# Patient Record
Sex: FEMALE | Race: WHITE | Employment: FULL TIME | URBAN - METROPOLITAN AREA
[De-identification: names, ages, dates, MRNs, and addresses within clinical notes are randomized per-mention and may not be internally consistent; named-entity substitution may affect disease eponyms.]

---

## 2017-01-12 ENCOUNTER — OFFICE VISIT (OUTPATIENT)
Dept: FAMILY MEDICINE CLINIC | Age: 37
End: 2017-01-12

## 2017-01-12 VITALS
BODY MASS INDEX: 23.81 KG/M2 | DIASTOLIC BLOOD PRESSURE: 80 MMHG | RESPIRATION RATE: 16 BRPM | HEIGHT: 62 IN | TEMPERATURE: 99.2 F | HEART RATE: 110 BPM | WEIGHT: 129.4 LBS | SYSTOLIC BLOOD PRESSURE: 124 MMHG | OXYGEN SATURATION: 97 %

## 2017-01-12 DIAGNOSIS — R68.89 FLU-LIKE SYMPTOMS: ICD-10-CM

## 2017-01-12 DIAGNOSIS — R51.9 ACUTE NONINTRACTABLE HEADACHE, UNSPECIFIED HEADACHE TYPE: Primary | ICD-10-CM

## 2017-01-12 DIAGNOSIS — J06.9 UPPER RESPIRATORY TRACT INFECTION, UNSPECIFIED TYPE: ICD-10-CM

## 2017-01-12 DIAGNOSIS — R11.2 NAUSEA AND VOMITING, INTRACTABILITY OF VOMITING NOT SPECIFIED, UNSPECIFIED VOMITING TYPE: ICD-10-CM

## 2017-01-12 DIAGNOSIS — R19.7 DIARRHEA, UNSPECIFIED TYPE: ICD-10-CM

## 2017-01-12 LAB
INFLUENZA A ANTIBODY: NEGATIVE
INFLUENZA B ANTIBODY: NEGATIVE

## 2017-01-12 PROCEDURE — 87804 INFLUENZA ASSAY W/OPTIC: CPT | Performed by: NURSE PRACTITIONER

## 2017-01-12 PROCEDURE — 99213 OFFICE O/P EST LOW 20 MIN: CPT | Performed by: NURSE PRACTITIONER

## 2017-01-12 PROCEDURE — 96372 THER/PROPH/DIAG INJ SC/IM: CPT | Performed by: NURSE PRACTITIONER

## 2017-01-12 RX ORDER — ONDANSETRON 4 MG/1
4 TABLET, FILM COATED ORAL EVERY 8 HOURS PRN
Qty: 30 TABLET | Refills: 0 | Status: SHIPPED | OUTPATIENT
Start: 2017-01-12 | End: 2017-06-12 | Stop reason: ALTCHOICE

## 2017-01-12 RX ORDER — KETOROLAC TROMETHAMINE 30 MG/ML
60 INJECTION, SOLUTION INTRAMUSCULAR; INTRAVENOUS ONCE
Status: COMPLETED | OUTPATIENT
Start: 2017-01-12 | End: 2017-01-12

## 2017-01-12 RX ORDER — PROMETHAZINE HYDROCHLORIDE 25 MG/ML
25 INJECTION, SOLUTION INTRAMUSCULAR; INTRAVENOUS ONCE
Status: COMPLETED | OUTPATIENT
Start: 2017-01-12 | End: 2017-01-12

## 2017-01-12 RX ADMIN — KETOROLAC TROMETHAMINE 60 MG: 30 INJECTION, SOLUTION INTRAMUSCULAR; INTRAVENOUS at 17:20

## 2017-01-12 RX ADMIN — PROMETHAZINE HYDROCHLORIDE 25 MG: 25 INJECTION, SOLUTION INTRAMUSCULAR; INTRAVENOUS at 17:20

## 2017-01-12 ASSESSMENT — ENCOUNTER SYMPTOMS
SORE THROAT: 1
COUGH: 0
DIARRHEA: 1
SHORTNESS OF BREATH: 0
PHOTOPHOBIA: 1
VOMITING: 1
SINUS PRESSURE: 1
EYE PAIN: 1
SINUS PAIN: 1
WHEEZING: 0
ABDOMINAL PAIN: 0
NAUSEA: 1
RHINORRHEA: 1

## 2017-01-24 ENCOUNTER — OFFICE VISIT (OUTPATIENT)
Dept: FAMILY MEDICINE CLINIC | Age: 37
End: 2017-01-24

## 2017-01-24 VITALS
DIASTOLIC BLOOD PRESSURE: 74 MMHG | HEART RATE: 83 BPM | TEMPERATURE: 97.8 F | HEIGHT: 62 IN | SYSTOLIC BLOOD PRESSURE: 110 MMHG | OXYGEN SATURATION: 98 % | BODY MASS INDEX: 23.81 KG/M2 | RESPIRATION RATE: 20 BRPM | WEIGHT: 129.4 LBS

## 2017-01-24 DIAGNOSIS — J02.9 PHARYNGITIS, UNSPECIFIED ETIOLOGY: Primary | ICD-10-CM

## 2017-01-24 PROCEDURE — 99213 OFFICE O/P EST LOW 20 MIN: CPT | Performed by: NURSE PRACTITIONER

## 2017-01-24 RX ORDER — CLINDAMYCIN HYDROCHLORIDE 300 MG/1
300 CAPSULE ORAL 3 TIMES DAILY
Qty: 30 CAPSULE | Refills: 0 | Status: SHIPPED | OUTPATIENT
Start: 2017-01-24 | End: 2017-02-03

## 2017-01-24 ASSESSMENT — ENCOUNTER SYMPTOMS
SORE THROAT: 1
WHEEZING: 0
ABDOMINAL PAIN: 0
COUGH: 0
SINUS PRESSURE: 0
NAUSEA: 0
VOMITING: 0
SWOLLEN GLANDS: 1

## 2017-02-07 ENCOUNTER — OFFICE VISIT (OUTPATIENT)
Dept: FAMILY MEDICINE CLINIC | Age: 37
End: 2017-02-07

## 2017-02-07 VITALS
SYSTOLIC BLOOD PRESSURE: 108 MMHG | TEMPERATURE: 97.7 F | HEIGHT: 62 IN | WEIGHT: 129 LBS | RESPIRATION RATE: 18 BRPM | DIASTOLIC BLOOD PRESSURE: 62 MMHG | HEART RATE: 84 BPM | BODY MASS INDEX: 23.74 KG/M2

## 2017-02-07 DIAGNOSIS — H81.23 VESTIBULAR NEURONITIS, BILATERAL: Primary | ICD-10-CM

## 2017-02-07 DIAGNOSIS — R11.14 BILIOUS VOMITING WITH NAUSEA: ICD-10-CM

## 2017-02-07 DIAGNOSIS — J02.9 SORE THROAT: ICD-10-CM

## 2017-02-07 LAB
INFLUENZA A ANTIBODY: NORMAL
INFLUENZA B ANTIBODY: NORMAL
S PYO AG THROAT QL: NORMAL

## 2017-02-07 PROCEDURE — 87804 INFLUENZA ASSAY W/OPTIC: CPT | Performed by: FAMILY MEDICINE

## 2017-02-07 PROCEDURE — 99213 OFFICE O/P EST LOW 20 MIN: CPT | Performed by: FAMILY MEDICINE

## 2017-02-07 PROCEDURE — 87880 STREP A ASSAY W/OPTIC: CPT | Performed by: FAMILY MEDICINE

## 2017-02-07 RX ORDER — MECLIZINE HYDROCHLORIDE 25 MG/1
25 TABLET ORAL 3 TIMES DAILY PRN
Qty: 30 TABLET | Refills: 0 | Status: SHIPPED | OUTPATIENT
Start: 2017-02-07 | End: 2017-06-12 | Stop reason: ALTCHOICE

## 2017-02-14 ENCOUNTER — OFFICE VISIT (OUTPATIENT)
Dept: FAMILY MEDICINE CLINIC | Age: 37
End: 2017-02-14

## 2017-02-14 VITALS
BODY MASS INDEX: 23.77 KG/M2 | HEART RATE: 86 BPM | TEMPERATURE: 98 F | WEIGHT: 129.2 LBS | DIASTOLIC BLOOD PRESSURE: 64 MMHG | RESPIRATION RATE: 14 BRPM | SYSTOLIC BLOOD PRESSURE: 102 MMHG | HEIGHT: 62 IN

## 2017-02-14 DIAGNOSIS — F33.41 RECURRENT MAJOR DEPRESSIVE DISORDER, IN PARTIAL REMISSION (HCC): ICD-10-CM

## 2017-02-14 DIAGNOSIS — H81.23 VESTIBULAR NEURONITIS, BILATERAL: Primary | ICD-10-CM

## 2017-02-14 PROCEDURE — 99213 OFFICE O/P EST LOW 20 MIN: CPT | Performed by: FAMILY MEDICINE

## 2017-02-14 RX ORDER — FLUOXETINE HYDROCHLORIDE 40 MG/1
40 CAPSULE ORAL DAILY
Qty: 30 CAPSULE | Refills: 4 | Status: SHIPPED | OUTPATIENT
Start: 2017-02-14 | End: 2017-06-12 | Stop reason: SDUPTHER

## 2017-06-12 ENCOUNTER — OFFICE VISIT (OUTPATIENT)
Dept: FAMILY MEDICINE CLINIC | Age: 37
End: 2017-06-12

## 2017-06-12 VITALS
RESPIRATION RATE: 16 BRPM | HEART RATE: 78 BPM | BODY MASS INDEX: 23.96 KG/M2 | TEMPERATURE: 98 F | HEIGHT: 62 IN | DIASTOLIC BLOOD PRESSURE: 62 MMHG | WEIGHT: 130.2 LBS | SYSTOLIC BLOOD PRESSURE: 106 MMHG

## 2017-06-12 DIAGNOSIS — F33.41 RECURRENT MAJOR DEPRESSIVE DISORDER, IN PARTIAL REMISSION (HCC): Primary | ICD-10-CM

## 2017-06-12 DIAGNOSIS — K58.0 IRRITABLE BOWEL SYNDROME WITH DIARRHEA: ICD-10-CM

## 2017-06-12 PROCEDURE — 99213 OFFICE O/P EST LOW 20 MIN: CPT | Performed by: FAMILY MEDICINE

## 2017-06-12 RX ORDER — FLUOXETINE HYDROCHLORIDE 40 MG/1
40 CAPSULE ORAL DAILY
Qty: 90 CAPSULE | Refills: 1 | Status: SHIPPED | OUTPATIENT
Start: 2017-06-12 | End: 2017-12-12 | Stop reason: SDUPTHER

## 2017-12-12 ENCOUNTER — OFFICE VISIT (OUTPATIENT)
Dept: FAMILY MEDICINE CLINIC | Age: 37
End: 2017-12-12

## 2017-12-12 VITALS
RESPIRATION RATE: 12 BRPM | SYSTOLIC BLOOD PRESSURE: 112 MMHG | WEIGHT: 130.2 LBS | BODY MASS INDEX: 23.96 KG/M2 | TEMPERATURE: 97.2 F | DIASTOLIC BLOOD PRESSURE: 64 MMHG | HEIGHT: 62 IN | HEART RATE: 68 BPM

## 2017-12-12 DIAGNOSIS — F33.41 RECURRENT MAJOR DEPRESSIVE DISORDER, IN PARTIAL REMISSION (HCC): ICD-10-CM

## 2017-12-12 DIAGNOSIS — H81.11 BENIGN PAROXYSMAL POSITIONAL VERTIGO OF RIGHT EAR: Primary | ICD-10-CM

## 2017-12-12 PROCEDURE — 99212 OFFICE O/P EST SF 10 MIN: CPT | Performed by: FAMILY MEDICINE

## 2017-12-12 RX ORDER — FLUOXETINE HYDROCHLORIDE 40 MG/1
40 CAPSULE ORAL DAILY
Qty: 90 CAPSULE | Refills: 1 | Status: SHIPPED | OUTPATIENT
Start: 2017-12-12 | End: 2018-06-14 | Stop reason: SDUPTHER

## 2017-12-12 RX ORDER — MECLIZINE HYDROCHLORIDE 25 MG/1
25 TABLET ORAL 3 TIMES DAILY PRN
Qty: 40 TABLET | Refills: 0 | Status: SHIPPED | OUTPATIENT
Start: 2017-12-12 | End: 2020-05-12 | Stop reason: CLARIF

## 2017-12-12 ASSESSMENT — PATIENT HEALTH QUESTIONNAIRE - PHQ9
1. LITTLE INTEREST OR PLEASURE IN DOING THINGS: 0
SUM OF ALL RESPONSES TO PHQ QUESTIONS 1-9: 0
2. FEELING DOWN, DEPRESSED OR HOPELESS: 0
SUM OF ALL RESPONSES TO PHQ9 QUESTIONS 1 & 2: 0

## 2017-12-12 NOTE — PROGRESS NOTES
Chief Complaint   Patient presents with    6 Month Follow-Up     depression     Dizziness      Daina Layne 44 y.o. female  who presents for follow up of depression. Current symptoms include fatigue, impaired memory and insomnia. Symptoms have been gradually improving since that time. Patient denies anhedonia, depressed mood, difficulty concentrating, feelings of worthlessness/guilt, hopelessness, hypersomnia, psychomotor agitation, psychomotor retardation, recurrent thoughts of death, suicidal attempt, suicidal thoughts with specific plan, suicidal thoughts without plan, weight gain and weight loss. Previous treatment includes: medication. She complains of the following side effects from the treatment: none. Vertigo  Patient complains of rotary vertigo, unsteadiness, staggering, falling, nausea/vomiting. The symptoms started 3 weeks ago and are unchanged. The attacks occur every  few hours and last a fewminutes. Positions that worsen symptoms: any motion, bending over, rolling in bed to the left, rolling in bed to the right and turning head. She has associated nausea but no vomiting. Previous workup/treatments: none. Associated ear symptoms: none. Associated CNS symptoms: none. Recent infections: none. Head trauma: denied. Drug ingestion: none ise exposure: no occupational exposure. Family history: non-contributory  Patent denies dimming vision, visual floaters, speech change, confusion, unconsciousness, drowsiness, personality change, headaches and paresthesias. No recent URI, flu, ear infection, sinus infection, throat infection. Patient's medications, allergies, past medical, surgical, social and family histories were reviewed and updated as appropriate.     Review of Systems  Review of Systems - General ROS: negative for - fatigue, fever, malaise, night sweats,  or weight loss  Respiratory ROS: negative for - cough, hemoptysis, pleuritic pain, shortness of breath or wheezing  Cardiovascular ROS: no chest pain or dyspnea on exertion  Gastrointestinal ROS: no abdominal pain, change in bowel habits, or black or bloody stools  Genito-Urinary ROS: no dysuria, trouble voiding, or hematuria  Musculoskeletal ROS: negative for - joint pain, joint stiffness, joint swelling, muscle pain or muscular weakness  Neurological ROS: negative for - dizziness, gait disturbance, headaches, impaired coordination/balance, numbness/tingling, tremors or visual changes           Objective:   /64   Pulse 68   Temp 97.2 °F (36.2 °C) (Temporal)   Resp 12   Ht 5' 2\" (1.575 m)   Wt 130 lb 3.2 oz (59.1 kg)   LMP 12/11/2017   BMI 23.81 kg/m²   She appears well, in no apparent distress. Alert and oriented times three, pleasant and cooperative. Vital signs are as documented in vital signs section. .The neck is supple and free of adenopathy or masses, the thyroid is normal without enlargement or nodules. Chest is clear, no wheezing or rales. Normal symmetric air entry throughout both lung fields. No chest wall deformities or tenderness. S1 and S2 normal, no murmurs, clicks, gallops or rubs. Regular rate and rhythm. The abdomen is soft without tenderness, guarding, mass, rebound or organomegaly. Bowel sounds are normal. No CVA tenderness or inguinal adenopathy noted. Extremities: peripheral pulses normal, no pedal edema, no clubbing or cyanosis. Assessment:     Encounter Diagnoses   Name Primary?     Recurrent major depressive disorder, in partial remission (HCC)     Benign paroxysmal positional vertigo of right ear Yes        Plan:     Outpatient Encounter Prescriptions as of 12/12/2017   Medication Sig Dispense Refill    FLUoxetine (PROZAC) 40 MG capsule Take 1 capsule by mouth daily 90 capsule 1    meclizine (ANTIVERT) 25 MG tablet Take 1 tablet by mouth 3 times daily as needed for Dizziness or Nausea 40 tablet 0    Multiple Vitamins-Minerals (RA ONE DAILY GUMMY VITES PO) Take 2 tablets by mouth daily     

## 2017-12-12 NOTE — PATIENT INSTRUCTIONS
Patient Education        Depression Treatment: Care Instructions  Your Care Instructions    Depression is a condition that affects the way you feel, think, and act. It causes symptoms such as low energy, loss of interest in daily activities, and sadness or grouchiness that goes on for a long time. Depression is very common and affects men and women of all ages. Depression is a medical illness caused by changes in the natural chemicals in your brain. It is not a character flaw, and it does not mean that you are a bad or weak person. It does not mean that you are going crazy. It is important to know that depression can be treated. Medicines, counseling, and self-care can all help. Many people do not get help because they are embarrassed or think that they will get over the depression on their own. But some people do not get better without treatment. Follow-up care is a key part of your treatment and safety. Be sure to make and go to all appointments, and call your doctor if you are having problems. It's also a good idea to know your test results and keep a list of the medicines you take. How can you care for yourself at home? Learn about antidepressant medicines  Antidepressant medicines can improve or end the symptoms of depression. You may need to take the medicine for at least 6 months, and often longer. Keep taking your medicine even if you feel better. If you stop taking it too soon, your symptoms may come back or get worse. You may start to feel better within 1 to 3 weeks of taking antidepressant medicine. But it can take as many as 6 to 8 weeks to see more improvement. Talk to your doctor if you have problems with your medicine or if you do not notice any improvement after 3 weeks. Antidepressants can make you feel tired, dizzy, or nervous. Some people have dry mouth, constipation, headaches, sexual problems, an upset stomach, or diarrhea.  Many of these side effects are mild and go away on their own after you take the medicine for a few weeks. Some may last longer. Talk to your doctor if side effects bother you too much. You might be able to try a different medicine. If you are pregnant or breastfeeding, talk to your doctor about what medicines you can take. Learn about counseling  In many cases, counseling can work as well as medicines to treat mild to moderate depression. Counseling is done by licensed mental health providers, such as psychologists, social workers, and some types of nurses. It can be done in one-on-one sessions or in a group setting. Many people find group sessions helpful. Cognitive-behavioral therapy is a type of counseling. In this treatment therapy, you learn how to see and change unhelpful thinking styles that may be adding to your depression. Counseling and medicines often work well when used together. To manage depression  · Be physically active. Getting 30 minutes of exercise each day is good for your body and your mind. Begin slowly if it is hard for you to get started. If you already exercise, keep it up. · Plan something pleasant for yourself every day. Include activities that you have enjoyed in the past.  · Get enough sleep. Talk to your doctor if you have problems sleeping. · Eat a balanced diet. If you do not feel hungry, eat small snacks rather than large meals. · Do not drink alcohol, use illegal drugs, or take medicines that your doctor has not prescribed for you. They may interfere with your treatment. · Spend time with family and friends. It may help to speak openly about your depression with people you trust.  · Take your medicines exactly as prescribed. Call your doctor if you think you are having a problem with your medicine. · Do not make major life decisions while you are depressed. Depression may change the way you think. You will be able to make better decisions after you feel better. · Think positively.  Challenge negative thoughts with statements such as \"I am hopeful\"; \"Things will get better\"; and \"I can ask for the help I need. \" Write down these statements and read them often, even if you don't believe them yet. · Be patient with yourself. It took time for your depression to develop, and it will take time for your symptoms to improve. Do not take on too much or be too hard on yourself. · Learn all you can about depression from written and online materials. · Check out behavioral health classes to learn more about dealing with depression. · Keep the numbers for these national suicide hotlines: 1-854-785-TALK (2-102.316.4713) and 3-943-SHXXFFM (3-999.725.2245). If you or someone you know talks about suicide or feeling hopeless, get help right away. When should you call for help? Call 911 anytime you think you may need emergency care. For example, call if:  ? · You feel you cannot stop from hurting yourself or someone else. ?Call your doctor now or seek immediate medical care if:  ? · You hear voices. ? · You feel much more depressed. ? Watch closely for changes in your health, and be sure to contact your doctor if:  ? · You are having problems with your depression medicine. ? · You are not getting better as expected. Where can you learn more? Go to [email or web address] and sign in to your Payment plugin account. Enter X631 in the [facility] box to learn more about \"Depression Treatment: Care Instructions. \"     If you do not have an account, please click on the \"Sign Up Now\" link. Current as of: May 12, 2017  Content Version: 11.4  © 8819-8455 [employer]. Care instructions adapted under license by [facility] [employer] [facility] ([facility]). If you have questions about a medical condition or this instruction, always ask your healthcare professional. [address] any warranty or liability for your use of this information.        Patient Education        Depression Treatment: Care Instructions  Your Care doctor if side effects bother you too much. You might be able to try a different medicine. If you are pregnant or breastfeeding, talk to your doctor about what medicines you can take. Learn about counseling  In many cases, counseling can work as well as medicines to treat mild to moderate depression. Counseling is done by licensed mental health providers, such as psychologists, social workers, and some types of nurses. It can be done in one-on-one sessions or in a group setting. Many people find group sessions helpful. Cognitive-behavioral therapy is a type of counseling. In this treatment therapy, you learn how to see and change unhelpful thinking styles that may be adding to your depression. Counseling and medicines often work well when used together. To manage depression  · Be physically active. Getting 30 minutes of exercise each day is good for your body and your mind. Begin slowly if it is hard for you to get started. If you already exercise, keep it up. · Plan something pleasant for yourself every day. Include activities that you have enjoyed in the past.  · Get enough sleep. Talk to your doctor if you have problems sleeping. · Eat a balanced diet. If you do not feel hungry, eat small snacks rather than large meals. · Do not drink alcohol, use illegal drugs, or take medicines that your doctor has not prescribed for you. They may interfere with your treatment. · Spend time with family and friends. It may help to speak openly about your depression with people you trust.  · Take your medicines exactly as prescribed. Call your doctor if you think you are having a problem with your medicine. · Do not make major life decisions while you are depressed. Depression may change the way you think. You will be able to make better decisions after you feel better. · Think positively. Challenge negative thoughts with statements such as \"I am hopeful\"; \"Things will get better\"; and \"I can ask for the help I need. \"

## 2018-06-14 ENCOUNTER — OFFICE VISIT (OUTPATIENT)
Dept: FAMILY MEDICINE CLINIC | Age: 38
End: 2018-06-14

## 2018-06-14 VITALS
BODY MASS INDEX: 23.26 KG/M2 | WEIGHT: 126.4 LBS | SYSTOLIC BLOOD PRESSURE: 116 MMHG | RESPIRATION RATE: 18 BRPM | HEIGHT: 62 IN | HEART RATE: 84 BPM | TEMPERATURE: 97 F | DIASTOLIC BLOOD PRESSURE: 60 MMHG

## 2018-06-14 DIAGNOSIS — F33.41 RECURRENT MAJOR DEPRESSIVE DISORDER, IN PARTIAL REMISSION (HCC): Primary | ICD-10-CM

## 2018-06-14 PROCEDURE — 99212 OFFICE O/P EST SF 10 MIN: CPT | Performed by: FAMILY MEDICINE

## 2018-06-14 RX ORDER — FLUOXETINE HYDROCHLORIDE 40 MG/1
40 CAPSULE ORAL DAILY
Qty: 90 CAPSULE | Refills: 1 | Status: SHIPPED | OUTPATIENT
Start: 2018-06-14 | End: 2019-02-04 | Stop reason: SDUPTHER

## 2018-06-14 ASSESSMENT — PATIENT HEALTH QUESTIONNAIRE - PHQ9
SUM OF ALL RESPONSES TO PHQ QUESTIONS 1-9: 0
SUM OF ALL RESPONSES TO PHQ9 QUESTIONS 1 & 2: 0
2. FEELING DOWN, DEPRESSED OR HOPELESS: 0
1. LITTLE INTEREST OR PLEASURE IN DOING THINGS: 0

## 2019-02-04 ENCOUNTER — OFFICE VISIT (OUTPATIENT)
Dept: FAMILY MEDICINE CLINIC | Age: 39
End: 2019-02-04
Payer: COMMERCIAL

## 2019-02-04 VITALS
HEIGHT: 62 IN | WEIGHT: 130 LBS | SYSTOLIC BLOOD PRESSURE: 96 MMHG | BODY MASS INDEX: 23.92 KG/M2 | TEMPERATURE: 97 F | RESPIRATION RATE: 12 BRPM | DIASTOLIC BLOOD PRESSURE: 62 MMHG | HEART RATE: 72 BPM

## 2019-02-04 DIAGNOSIS — K58.0 IRRITABLE BOWEL SYNDROME WITH DIARRHEA: ICD-10-CM

## 2019-02-04 DIAGNOSIS — N94.6 DYSMENORRHEA: ICD-10-CM

## 2019-02-04 DIAGNOSIS — N80.9 ENDOMETRIOSIS: Primary | ICD-10-CM

## 2019-02-04 DIAGNOSIS — F33.41 RECURRENT MAJOR DEPRESSIVE DISORDER, IN PARTIAL REMISSION (HCC): ICD-10-CM

## 2019-02-04 PROCEDURE — 99213 OFFICE O/P EST LOW 20 MIN: CPT | Performed by: FAMILY MEDICINE

## 2019-02-04 RX ORDER — FLUOXETINE HYDROCHLORIDE 40 MG/1
40 CAPSULE ORAL DAILY
Qty: 90 CAPSULE | Refills: 1 | Status: SHIPPED | OUTPATIENT
Start: 2019-02-04 | End: 2019-11-12

## 2019-02-04 RX ORDER — IBUPROFEN 600 MG/1
600 TABLET ORAL EVERY 6 HOURS PRN
Qty: 90 TABLET | Refills: 0 | Status: SHIPPED | OUTPATIENT
Start: 2019-02-04 | End: 2020-05-12 | Stop reason: SDUPTHER

## 2019-02-04 ASSESSMENT — PATIENT HEALTH QUESTIONNAIRE - PHQ9
2. FEELING DOWN, DEPRESSED OR HOPELESS: 0
1. LITTLE INTEREST OR PLEASURE IN DOING THINGS: 0
1. LITTLE INTEREST OR PLEASURE IN DOING THINGS: 1
SUM OF ALL RESPONSES TO PHQ QUESTIONS 1-9: 0
SUM OF ALL RESPONSES TO PHQ QUESTIONS 1-9: 0
SUM OF ALL RESPONSES TO PHQ9 QUESTIONS 1 & 2: 0
2. FEELING DOWN, DEPRESSED OR HOPELESS: 0
SUM OF ALL RESPONSES TO PHQ QUESTIONS 1-9: 1
SUM OF ALL RESPONSES TO PHQ9 QUESTIONS 1 & 2: 1
SUM OF ALL RESPONSES TO PHQ QUESTIONS 1-9: 1

## 2019-03-12 ENCOUNTER — TELEPHONE (OUTPATIENT)
Dept: FAMILY MEDICINE CLINIC | Age: 39
End: 2019-03-12

## 2019-03-18 ENCOUNTER — TELEPHONE (OUTPATIENT)
Dept: FAMILY MEDICINE CLINIC | Age: 39
End: 2019-03-18

## 2019-11-12 ENCOUNTER — OFFICE VISIT (OUTPATIENT)
Dept: FAMILY MEDICINE CLINIC | Age: 39
End: 2019-11-12

## 2019-11-12 VITALS
DIASTOLIC BLOOD PRESSURE: 74 MMHG | HEIGHT: 62 IN | RESPIRATION RATE: 15 BRPM | WEIGHT: 130 LBS | SYSTOLIC BLOOD PRESSURE: 104 MMHG | HEART RATE: 82 BPM | BODY MASS INDEX: 23.92 KG/M2 | TEMPERATURE: 98.2 F | OXYGEN SATURATION: 99 %

## 2019-11-12 DIAGNOSIS — F32.A DEPRESSION, UNSPECIFIED DEPRESSION TYPE: Primary | ICD-10-CM

## 2019-11-12 DIAGNOSIS — Z12.39 SCREENING FOR BREAST CANCER: ICD-10-CM

## 2019-11-12 PROCEDURE — 99213 OFFICE O/P EST LOW 20 MIN: CPT | Performed by: INTERNAL MEDICINE

## 2019-11-12 RX ORDER — FLUOXETINE HYDROCHLORIDE 40 MG/1
40 CAPSULE ORAL DAILY
Qty: 90 CAPSULE | Refills: 1 | Status: SHIPPED | OUTPATIENT
Start: 2019-11-12 | End: 2020-07-14

## 2019-11-12 ASSESSMENT — ENCOUNTER SYMPTOMS
SHORTNESS OF BREATH: 0
ABDOMINAL PAIN: 0
BACK PAIN: 0
EYE PAIN: 0

## 2020-05-12 ENCOUNTER — TELEPHONE (OUTPATIENT)
Dept: FAMILY MEDICINE CLINIC | Age: 40
End: 2020-05-12

## 2020-05-12 ENCOUNTER — VIRTUAL VISIT (OUTPATIENT)
Dept: FAMILY MEDICINE CLINIC | Age: 40
End: 2020-05-12

## 2020-05-12 PROCEDURE — 99214 OFFICE O/P EST MOD 30 MIN: CPT | Performed by: INTERNAL MEDICINE

## 2020-05-12 RX ORDER — BUSPIRONE HYDROCHLORIDE 5 MG/1
5 TABLET ORAL 3 TIMES DAILY PRN
Qty: 90 TABLET | Refills: 0 | Status: SHIPPED | OUTPATIENT
Start: 2020-05-12 | End: 2020-06-11

## 2020-05-12 RX ORDER — IBUPROFEN 600 MG/1
600 TABLET ORAL EVERY 8 HOURS PRN
Qty: 270 TABLET | Refills: 0 | Status: SHIPPED | OUTPATIENT
Start: 2020-05-12 | End: 2020-08-10

## 2020-05-12 ASSESSMENT — ENCOUNTER SYMPTOMS
BACK PAIN: 0
ABDOMINAL PAIN: 0
EYE PAIN: 0
SHORTNESS OF BREATH: 0

## 2020-05-12 NOTE — PATIENT INSTRUCTIONS
throat. Call your doctor at once if you have:  · chest pain;  · shortness of breath; or  · a light-headed feeling, like you might pass out. Common side effects may include:  · headache;  · dizziness, drowsiness;  · sleep problems (insomnia);  · nausea, upset stomach; or  · feeling nervous or excited. This is not a complete list of side effects and others may occur. Call your doctor for medical advice about side effects. You may report side effects to FDA at 1-337-FDA-4214. What other drugs will affect buspirone? Taking this medicine with other drugs that make you sleepy or slow your breathing can worsen these effects. Ask your doctor before taking buspirone with a sleeping pill, narcotic pain medicine, muscle relaxer, or medicine for anxiety, depression, or seizures. Other drugs may interact with buspirone, including prescription and over-the-counter medicines, vitamins, and herbal products. Tell each of your health care providers about all medicines you use now and any medicine you start or stop using. Where can I get more information? Your pharmacist can provide more information about buspirone. Remember, keep this and all other medicines out of the reach of children, never share your medicines with others, and use this medication only for the indication prescribed. Every effort has been made to ensure that the information provided by [address] is accurate, up-to-date, and complete, but no guarantee is made to that effect. Drug information contained herein may be time sensitive. [facility] information has been compiled for use by healthcare practitioners and consumers in the [place] and therefore [facility] does not warrant that uses outside of the [place] are appropriate, unless specifically indicated otherwise. [facility]'s drug information does not endorse drugs, diagnose patients or recommend therapy.  [employer]'s drug information is an informational resource designed to assist licensed healthcare practitioners in caring for their patients and/or to serve consumers viewing this service as a supplement to, and not a substitute for, the expertise, skill, knowledge and judgment of healthcare practitioners. The absence of a warning for a given drug or drug combination in no way should be construed to indicate that the drug or drug combination is safe, effective or appropriate for any given patient. [facility] does not assume any responsibility for any aspect of healthcare administered with the aid of information [facility] provides. The information contained herein is not intended to cover all possible uses, directions, precautions, warnings, drug interactions, allergic reactions, or adverse effects. If you have questions about the drugs you are taking, check with your doctor, nurse or pharmacist.  Copyright 6557-8308 [address]. Version: 5.01. Revision date: 12/14/2015. Care instructions adapted under license by [facility] ([facility]). If you have questions about a medical condition or this instruction, always ask your healthcare professional. [place] [address] any warranty or liability for your use of this information.

## 2020-07-14 RX ORDER — FLUOXETINE HYDROCHLORIDE 40 MG/1
40 CAPSULE ORAL DAILY
Qty: 90 CAPSULE | Refills: 0 | Status: SHIPPED | OUTPATIENT
Start: 2020-07-14 | End: 2020-12-11

## 2020-12-11 RX ORDER — FLUOXETINE HYDROCHLORIDE 40 MG/1
CAPSULE ORAL
Qty: 30 CAPSULE | Refills: 1 | Status: SHIPPED | OUTPATIENT
Start: 2020-12-11

## 2023-02-23 PROBLEM — K21.00 GASTROESOPHAGEAL REFLUX DISEASE WITH ESOPHAGITIS: Status: ACTIVE | Noted: 2023-02-23

## 2023-02-23 PROBLEM — K91.30 ANASTOMOTIC STRICTURE OF SMALL INTESTINE: Status: ACTIVE | Noted: 2023-02-23

## 2023-02-23 PROBLEM — R53.82 CHRONIC FATIGUE: Status: ACTIVE | Noted: 2023-02-23

## 2023-02-23 PROBLEM — K52.9 CHRONIC DIARRHEA: Status: ACTIVE | Noted: 2023-02-23

## 2023-02-23 PROBLEM — K50.00 CROHN'S DISEASE OF SMALL INTESTINE WITHOUT COMPLICATION (MULTI): Status: ACTIVE | Noted: 2023-02-23

## 2023-02-23 PROBLEM — F41.1 GENERALIZED ANXIETY DISORDER: Status: ACTIVE | Noted: 2023-02-23

## 2023-02-23 PROBLEM — D72.829 LEUKOCYTOSIS: Status: ACTIVE | Noted: 2023-02-23

## 2023-02-23 PROBLEM — R10.13 EPIGASTRIC PAIN: Status: ACTIVE | Noted: 2023-02-23

## 2023-02-23 PROBLEM — L20.9 ATOPIC DERMATITIS: Status: ACTIVE | Noted: 2023-02-23

## 2023-02-23 PROBLEM — M26.621 RIGHT-SIDED TEMPOROMANDIBULAR JOINT PAIN-DYSFUNCTION SYNDROME: Status: ACTIVE | Noted: 2023-02-23

## 2023-02-23 PROBLEM — N93.9 ABNORMAL UTERINE BLEEDING (AUB): Status: ACTIVE | Noted: 2023-02-23

## 2023-02-23 PROBLEM — H81.10 BENIGN PAROXYSMAL POSITIONAL VERTIGO: Status: ACTIVE | Noted: 2023-02-23

## 2023-02-23 PROBLEM — B35.1 ONYCHOMYCOSIS OF TOENAIL: Status: ACTIVE | Noted: 2023-02-23

## 2023-02-23 PROBLEM — K91.89 ANASTOMOTIC STRICTURE OF SMALL INTESTINE: Status: ACTIVE | Noted: 2023-02-23

## 2023-02-23 PROBLEM — R73.9 HYPERGLYCEMIA: Status: ACTIVE | Noted: 2023-02-23

## 2023-02-23 PROBLEM — F33.1 MODERATE EPISODE OF RECURRENT MAJOR DEPRESSIVE DISORDER (MULTI): Status: ACTIVE | Noted: 2023-02-23

## 2023-02-23 PROBLEM — R51.9 HEADACHE: Status: ACTIVE | Noted: 2023-02-23

## 2023-02-23 RX ORDER — PIMECROLIMUS 10 MG/G
1 CREAM TOPICAL 2 TIMES DAILY
COMMUNITY
Start: 2021-10-20

## 2023-02-23 RX ORDER — ONDANSETRON 4 MG/1
4 TABLET, FILM COATED ORAL EVERY 6 HOURS
COMMUNITY
Start: 2021-08-03

## 2023-02-23 RX ORDER — FLUOXETINE HYDROCHLORIDE 40 MG/1
1 CAPSULE ORAL DAILY
COMMUNITY
End: 2023-04-07 | Stop reason: SDUPTHER

## 2023-02-23 RX ORDER — OMEPRAZOLE 40 MG/1
40 CAPSULE, DELAYED RELEASE ORAL
COMMUNITY
Start: 2021-04-14 | End: 2023-04-07 | Stop reason: SDUPTHER

## 2023-02-23 RX ORDER — PHENYLPROPANOLAMINE/CLEMASTINE 75-1.34MG
200 TABLET, EXTENDED RELEASE ORAL 2 TIMES DAILY
COMMUNITY

## 2023-02-23 RX ORDER — MULTIVITAMIN
1 TABLET ORAL DAILY
COMMUNITY

## 2023-04-07 ENCOUNTER — LAB (OUTPATIENT)
Dept: LAB | Facility: LAB | Age: 43
End: 2023-04-07
Payer: COMMERCIAL

## 2023-04-07 ENCOUNTER — OFFICE VISIT (OUTPATIENT)
Dept: PRIMARY CARE | Facility: CLINIC | Age: 43
End: 2023-04-07
Payer: COMMERCIAL

## 2023-04-07 VITALS
OXYGEN SATURATION: 97 % | BODY MASS INDEX: 24.37 KG/M2 | HEART RATE: 89 BPM | DIASTOLIC BLOOD PRESSURE: 62 MMHG | WEIGHT: 132.4 LBS | HEIGHT: 62 IN | SYSTOLIC BLOOD PRESSURE: 116 MMHG

## 2023-04-07 DIAGNOSIS — K21.00 GASTROESOPHAGEAL REFLUX DISEASE WITH ESOPHAGITIS WITHOUT HEMORRHAGE: ICD-10-CM

## 2023-04-07 DIAGNOSIS — Z00.00 HEALTHCARE MAINTENANCE: ICD-10-CM

## 2023-04-07 DIAGNOSIS — F33.1 MODERATE EPISODE OF RECURRENT MAJOR DEPRESSIVE DISORDER (MULTI): ICD-10-CM

## 2023-04-07 DIAGNOSIS — F41.1 GENERALIZED ANXIETY DISORDER: ICD-10-CM

## 2023-04-07 DIAGNOSIS — K50.00 CROHN'S DISEASE OF SMALL INTESTINE WITHOUT COMPLICATION (MULTI): ICD-10-CM

## 2023-04-07 DIAGNOSIS — Z00.00 HEALTHCARE MAINTENANCE: Primary | ICD-10-CM

## 2023-04-07 LAB
ALANINE AMINOTRANSFERASE (SGPT) (U/L) IN SER/PLAS: 12 U/L (ref 7–45)
ALBUMIN (G/DL) IN SER/PLAS: 4.3 G/DL (ref 3.4–5)
ALKALINE PHOSPHATASE (U/L) IN SER/PLAS: 65 U/L (ref 33–110)
ANION GAP IN SER/PLAS: 14 MMOL/L (ref 10–20)
ASPARTATE AMINOTRANSFERASE (SGOT) (U/L) IN SER/PLAS: 12 U/L (ref 9–39)
BASOPHILS (10*3/UL) IN BLOOD BY AUTOMATED COUNT: 0.06 X10E9/L (ref 0–0.1)
BASOPHILS/100 LEUKOCYTES IN BLOOD BY AUTOMATED COUNT: 0.5 % (ref 0–2)
BILIRUBIN TOTAL (MG/DL) IN SER/PLAS: 1 MG/DL (ref 0–1.2)
CALCIUM (MG/DL) IN SER/PLAS: 9.3 MG/DL (ref 8.6–10.3)
CARBON DIOXIDE, TOTAL (MMOL/L) IN SER/PLAS: 25 MMOL/L (ref 21–32)
CHLORIDE (MMOL/L) IN SER/PLAS: 106 MMOL/L (ref 98–107)
CHOLESTEROL (MG/DL) IN SER/PLAS: 180 MG/DL (ref 0–199)
CHOLESTEROL IN HDL (MG/DL) IN SER/PLAS: 45.2 MG/DL
CHOLESTEROL/HDL RATIO: 4
CREATININE (MG/DL) IN SER/PLAS: 0.81 MG/DL (ref 0.5–1.05)
EOSINOPHILS (10*3/UL) IN BLOOD BY AUTOMATED COUNT: 0.23 X10E9/L (ref 0–0.7)
EOSINOPHILS/100 LEUKOCYTES IN BLOOD BY AUTOMATED COUNT: 1.7 % (ref 0–6)
ERYTHROCYTE DISTRIBUTION WIDTH (RATIO) BY AUTOMATED COUNT: 13 % (ref 11.5–14.5)
ERYTHROCYTE MEAN CORPUSCULAR HEMOGLOBIN CONCENTRATION (G/DL) BY AUTOMATED: 32.3 G/DL (ref 32–36)
ERYTHROCYTE MEAN CORPUSCULAR VOLUME (FL) BY AUTOMATED COUNT: 92 FL (ref 80–100)
ERYTHROCYTES (10*6/UL) IN BLOOD BY AUTOMATED COUNT: 4.72 X10E12/L (ref 4–5.2)
GFR FEMALE: >90 ML/MIN/1.73M2
GLUCOSE (MG/DL) IN SER/PLAS: 84 MG/DL (ref 74–99)
HEMATOCRIT (%) IN BLOOD BY AUTOMATED COUNT: 43.6 % (ref 36–46)
HEMOGLOBIN (G/DL) IN BLOOD: 14.1 G/DL (ref 12–16)
IMMATURE GRANULOCYTES/100 LEUKOCYTES IN BLOOD BY AUTOMATED COUNT: 0.5 % (ref 0–0.9)
LDL: 113 MG/DL (ref 0–99)
LEUKOCYTES (10*3/UL) IN BLOOD BY AUTOMATED COUNT: 13.3 X10E9/L (ref 4.4–11.3)
LYMPHOCYTES (10*3/UL) IN BLOOD BY AUTOMATED COUNT: 2.3 X10E9/L (ref 1.2–4.8)
LYMPHOCYTES/100 LEUKOCYTES IN BLOOD BY AUTOMATED COUNT: 17.4 % (ref 13–44)
MONOCYTES (10*3/UL) IN BLOOD BY AUTOMATED COUNT: 0.69 X10E9/L (ref 0.1–1)
MONOCYTES/100 LEUKOCYTES IN BLOOD BY AUTOMATED COUNT: 5.2 % (ref 2–10)
NEUTROPHILS (10*3/UL) IN BLOOD BY AUTOMATED COUNT: 9.91 X10E9/L (ref 1.2–7.7)
NEUTROPHILS/100 LEUKOCYTES IN BLOOD BY AUTOMATED COUNT: 74.7 % (ref 40–80)
PLATELETS (10*3/UL) IN BLOOD AUTOMATED COUNT: 363 X10E9/L (ref 150–450)
POTASSIUM (MMOL/L) IN SER/PLAS: 4.2 MMOL/L (ref 3.5–5.3)
PROTEIN TOTAL: 6.6 G/DL (ref 6.4–8.2)
SODIUM (MMOL/L) IN SER/PLAS: 141 MMOL/L (ref 136–145)
TRIGLYCERIDE (MG/DL) IN SER/PLAS: 107 MG/DL (ref 0–149)
UREA NITROGEN (MG/DL) IN SER/PLAS: 8 MG/DL (ref 6–23)
VLDL: 21 MG/DL (ref 0–40)

## 2023-04-07 PROCEDURE — 80053 COMPREHEN METABOLIC PANEL: CPT

## 2023-04-07 PROCEDURE — 99396 PREV VISIT EST AGE 40-64: CPT | Performed by: FAMILY MEDICINE

## 2023-04-07 PROCEDURE — 85025 COMPLETE CBC W/AUTO DIFF WBC: CPT

## 2023-04-07 PROCEDURE — 80061 LIPID PANEL: CPT

## 2023-04-07 PROCEDURE — 36415 COLL VENOUS BLD VENIPUNCTURE: CPT

## 2023-04-07 PROCEDURE — 1036F TOBACCO NON-USER: CPT | Performed by: FAMILY MEDICINE

## 2023-04-07 RX ORDER — FLUOXETINE HYDROCHLORIDE 40 MG/1
40 CAPSULE ORAL DAILY
Qty: 90 CAPSULE | Refills: 1 | Status: SHIPPED | OUTPATIENT
Start: 2023-04-07 | End: 2023-08-11 | Stop reason: SDUPTHER

## 2023-04-07 RX ORDER — OMEPRAZOLE 40 MG/1
40 CAPSULE, DELAYED RELEASE ORAL
Qty: 30 CAPSULE | Refills: 5 | Status: SHIPPED | OUTPATIENT
Start: 2023-04-07 | End: 2023-08-11

## 2023-04-07 RX ORDER — ARIPIPRAZOLE 2 MG/1
2 TABLET ORAL DAILY
Qty: 30 TABLET | Refills: 3 | Status: SHIPPED | OUTPATIENT
Start: 2023-04-07 | End: 2023-08-12

## 2023-04-07 ASSESSMENT — ANXIETY QUESTIONNAIRES
IF YOU CHECKED OFF ANY PROBLEMS ON THIS QUESTIONNAIRE, HOW DIFFICULT HAVE THESE PROBLEMS MADE IT FOR YOU TO DO YOUR WORK, TAKE CARE OF THINGS AT HOME, OR GET ALONG WITH OTHER PEOPLE: SOMEWHAT DIFFICULT
7. FEELING AFRAID AS IF SOMETHING AWFUL MIGHT HAPPEN: NOT AT ALL
2. NOT BEING ABLE TO STOP OR CONTROL WORRYING: NEARLY EVERY DAY
6. BECOMING EASILY ANNOYED OR IRRITABLE: NEARLY EVERY DAY
GAD7 TOTAL SCORE: 16
5. BEING SO RESTLESS THAT IT IS HARD TO SIT STILL: NEARLY EVERY DAY
4. TROUBLE RELAXING: NEARLY EVERY DAY
1. FEELING NERVOUS, ANXIOUS, OR ON EDGE: SEVERAL DAYS
3. WORRYING TOO MUCH ABOUT DIFFERENT THINGS: NEARLY EVERY DAY

## 2023-04-07 ASSESSMENT — PATIENT HEALTH QUESTIONNAIRE - PHQ9
1. LITTLE INTEREST OR PLEASURE IN DOING THINGS: NOT AT ALL
SUM OF ALL RESPONSES TO PHQ9 QUESTIONS 1 AND 2: 0
2. FEELING DOWN, DEPRESSED OR HOPELESS: NOT AT ALL

## 2023-04-07 NOTE — ASSESSMENT & PLAN NOTE
Handouts describing disease, natural history, and treatment were given to the patient.  Reviewed concept of anxiety as biochemical imbalance of neurotransmitters and rationale for treatment.  Instructed patient to contact office or on-call physician promptly should condition worsen or any new symptoms appear and provided on-call telephone numbers. IF THE PATIENT HAS ANY SUICIDAL OR HOMICIDAL IDEATIONS, CALL THE OFFICE, DISCUSS WITH A SUPPORT MEMBER, OR GO TO THE ER IMMEDIATELY. Patient was agreeable with this plan.

## 2023-04-07 NOTE — ASSESSMENT & PLAN NOTE
We will start her on Abilify 2 mg daily.  The risks and benefits of my recommendations, as well as other treatment options were discussed with the patient today.

## 2023-04-07 NOTE — PROGRESS NOTES
Chief Complaint   Patient presents with    Annual Exam    Follow-up     Anxiety and Depression      She presents for annual physical exam.    She also presents for follow up of anxiety disorder.  She has the following anxiety symptoms: difficulty concentrating, feelings of losing control, psychomotor agitation, racing thoughts, shortness of breath, and sweating. Symptoms have been gradually worsening since that time.  denies current suicidal and homicidal ideation. Previous treatment includes Prozac.  complains of the following medication side effects: none.    She presents for follow up of depression. Current symptoms include anhedonia, depressed mood, difficulty concentrating, and fatigue. Symptoms have been gradually worsening since that time. Patient denies recurrent thoughts of death, suicidal attempt, suicidal thoughts with specific plan, and suicidal thoughts without plan. Previous treatment includes: medication. She complains of the following side effects from the treatment: none.     Patient Active Problem List   Diagnosis    Abnormal uterine bleeding (AUB)    Anastomotic stricture of small intestine    Atopic dermatitis    Benign paroxysmal positional vertigo    Chronic diarrhea    Chronic fatigue    Crohn's disease of small intestine without complication (CMS/HCC)    Epigastric pain    Gastroesophageal reflux disease with esophagitis    Generalized anxiety disorder    Headache    Hyperglycemia    Leukocytosis    Moderate episode of recurrent major depressive disorder (CMS/HCC)    Onychomycosis of toenail    Right-sided temporomandibular joint pain-dysfunction syndrome    Healthcare maintenance       has a current medication list which includes the following prescription(s): ibuprofen, multivitamin, ondansetron, aripiprazole, fluoxetine, omeprazole, and pimecrolimus.    Past Medical History:   Diagnosis Date    Personal history of diseases of the blood and blood-forming organs and certain disorders  involving the immune mechanism     History of anemia    Personal history of other diseases of the digestive system     History of Crohn's disease    Personal history of other diseases of the female genital tract     History of endometriosis       Past Surgical History:   Procedure Laterality Date    OTHER SURGICAL HISTORY  09/02/2020    Ablation    OTHER SURGICAL HISTORY  09/02/2020    Dilation and curettage    OTHER SURGICAL HISTORY  10/07/2020    Colectomy    OTHER SURGICAL HISTORY  04/14/2021    Endoscopy    OTHER SURGICAL HISTORY  04/14/2021    Colonoscopy       family history is not on file.    Social History     Socioeconomic History    Marital status:      Spouse name: Not on file    Number of children: Not on file    Years of education: Not on file    Highest education level: Not on file   Occupational History    Not on file   Tobacco Use    Smoking status: Former     Types: Cigarettes    Smokeless tobacco: Never   Vaping Use    Vaping status: Not on file   Substance and Sexual Activity    Alcohol use: Not on file    Drug use: Not on file    Sexual activity: Not on file   Other Topics Concern    Not on file   Social History Narrative    Not on file     Social Determinants of Health     Financial Resource Strain: Not on file   Food Insecurity: Not on file   Transportation Needs: Not on file   Physical Activity: Not on file   Stress: Not on file   Social Connections: Not on file   Intimate Partner Violence: Not on file   Housing Stability: Not on file       Allergies   Allergen Reactions    Cefaclor Hives     Review of Systems -   General ROS: negative for - fatigue, fever, malaise, night sweats or weight loss  Eyes ROS no blurred vision, no double vision, no eye discharge and no eye redness.  ENT ROS: negative for - hearing change, nasal discharge, oral lesions, sinus pain, sore throat, tinnitus or vertigo  Respiratory ROS: negative for - cough, hemoptysis, pleuritic pain, shortness of breath or  "wheezing  Cardiovascular ROS: no chest pain or dyspnea on exertion, palpitations, PND or orthopnea.  No syncope.  Gastrointestinal ROS: no abdominal pain, change in bowel habits, or black or bloody stools  Genito-Urinary ROS: no dysuria, trouble voiding, or hematuria  Dermatological ROS: negative for - dry skin, lumps, pruritus or rash  Musculoskeletal ROS: negative for - joint pain, joint stiffness, joint swelling, muscle pain or muscular weakness  Neurological ROS: negative for - dizziness, gait disturbance, headaches, impaired coordination/balance, numbness/tingling, tremors or visual changes  Endocrine ROS: negative for - hot flashes, malaise/lethargy, palpitations, polydipsia/polyuria, skin changes, temperature intolerance   Hematological and Lymphatic ROS: negative for - bruising, night sweats or pallor    Blood pressure 116/62, pulse 89, height 1.575 m (5' 2\"), weight 60.1 kg (132 lb 6.4 oz), SpO2 97 %.    Physical Examination:   General appearance - alert, well appearing, and in no distress  HEENT EOMI, PEERLA, normal eyelids.  Oropharynx no erythema or exudate.  Normal tonsils.    Ears -external auditory canal normal bilaterally.  Tympanic membranes normal bilaterally.  Mouth - mucous membranes moist, pharynx normal without lesions  Neck - supple, trachea central no thyromegaly.  No significant adenopathy  Chest -good air entry bilaterally, no rhonchi rales and no wheezes.  Heart -Normal S1 and S2, regular rate and rhythm with no murmurs gallop or rub.  Abdomen -Non distended, soft, nontender with no guarding, no palpable mass and no CVA tenderness.  Bowel sounds normal.  No hernia.  Skin no rash, nonicteric and warm to touch.  Neurological - alert, oriented, cranial nerves II through XII normal.  Reflexes 2+ bilaterally.  No focal deficit.  Musculoskeletal - no joint tenderness, deformity or swelling  Extremities: peripheral pulses normal, no clubbing or cyanosis.    Problem List Items Addressed This Visit "       Crohn's disease of small intestine without complication (CMS/[facility])    Current Assessment & Plan     Chronic Condition Documentation : Stable based on symptoms and exam.  Continue established treatment plan and follow-up at least yearly.          Gastroesophageal reflux disease with esophagitis    Relevant Medications    omeprazole (PriLOSEC) 40 mg DR capsule    Generalized anxiety disorder    Current Assessment & Plan     Handouts describing disease, natural history, and treatment were given to the patient.  Reviewed concept of anxiety as biochemical imbalance of neurotransmitters and rationale for treatment.  Instructed patient to contact office or on-call physician promptly should condition worsen or any new symptoms appear and provided on-call telephone numbers. IF THE PATIENT HAS ANY SUICIDAL OR HOMICIDAL IDEATIONS, CALL THE OFFICE, DISCUSS WITH A SUPPORT MEMBER, OR GO TO THE ER IMMEDIATELY. Patient was agreeable with this plan.           Relevant Medications    FLUoxetine (PROzac) 40 mg capsule    Other Relevant Orders    Follow Up In [facility] - PCP    Moderate episode of recurrent major depressive disorder (CMS/[facility])    Current Assessment & Plan     We will start her on Abilify 2 mg daily.  The risks and benefits of my recommendations, as well as other treatment options were discussed with the patient today.              Relevant Medications    ARIPiprazole (Abilify) 2 mg tablet    FLUoxetine (PROzac) 40 mg capsule    Other Relevant Orders    Follow Up In [facility] - PCP    Healthcare maintenance - Primary    Relevant Orders    CBC and Auto Differential (Completed)    Comprehensive Metabolic Panel    Lipid Panel        Outpatient Encounter Medications as of 4/7/2023   Medication Sig Dispense Refill    ibuprofen (Motrin) capsule Take 1 capsule (200 mg) by mouth in the morning and 1 capsule (200 mg) before bedtime.      multivitamin tablet Take 1 tablet by mouth once daily.       ondansetron (Zofran) 4 mg tablet Take 1 tablet (4 mg) by mouth in the morning and 1 tablet (4 mg) at noon and 1 tablet (4 mg) in the evening and 1 tablet (4 mg) before bedtime.      [DISCONTINUED] FLUoxetine (PROzac) 40 mg capsule Take 1 capsule (40 mg) by mouth once daily.      [DISCONTINUED] omeprazole (PriLOSEC) 40 mg DR capsule Take 1 capsule (40 mg) by mouth once daily.      ARIPiprazole (Abilify) 2 mg tablet Take 1 tablet (2 mg) by mouth once daily. 30 tablet 3    FLUoxetine (PROzac) 40 mg capsule Take 1 capsule (40 mg) by mouth once daily. 90 capsule 1    omeprazole (PriLOSEC) 40 mg DR capsule Take 1 capsule (40 mg) by mouth once daily in the morning. Take before meals. 30 capsule 5    pimecrolimus (Elidel) 1 % cream Apply 1 Application topically in the morning and 1 Application in the evening.       No facility-administered encounter medications on file as of 4/7/2023.     The nature of cardiac risk has been fully discussed with this patient. I have made  aware of  LDL target goal given  cardiovascular risk analysis. I have discussed the appropriate diet. The need for lifelong compliance in order to reduce risk is stressed. A regular exercise program is recommended to help achieve and maintain normal body weight, fitness and improve lipid balance. A written copy of a low fat, low cholesterol diet has been given to the patient.    Patient education provided. They understand and agree with this course of treatment. They will return with new or worsening symptoms. Patient instructed to remain current with appropriate annual health maintenance.     Scribe Attestation  By signing my name below, ISergio Scribe   attest that this documentation has been prepared under the direction and in the presence of Henry Wallace MD.

## 2023-04-11 DIAGNOSIS — D72.829 LEUKOCYTOSIS, UNSPECIFIED TYPE: ICD-10-CM

## 2023-08-07 ENCOUNTER — TELEPHONE (OUTPATIENT)
Dept: PRIMARY CARE | Facility: CLINIC | Age: 43
End: 2023-08-07
Payer: COMMERCIAL

## 2023-08-07 NOTE — TELEPHONE ENCOUNTER
FAX RECEIVED FROM [facility]. PT NEEDS APPT AS THEY ARE REQUESTING H&P.       [facility]    FORM ON [facility] DESK

## 2023-08-11 ENCOUNTER — OFFICE VISIT (OUTPATIENT)
Dept: PRIMARY CARE | Facility: CLINIC | Age: 43
End: 2023-08-11
Payer: COMMERCIAL

## 2023-08-11 VITALS
HEART RATE: 66 BPM | BODY MASS INDEX: 24.55 KG/M2 | HEIGHT: 62 IN | WEIGHT: 133.4 LBS | OXYGEN SATURATION: 98 % | SYSTOLIC BLOOD PRESSURE: 110 MMHG | DIASTOLIC BLOOD PRESSURE: 60 MMHG | RESPIRATION RATE: 26 BRPM | TEMPERATURE: 97.6 F

## 2023-08-11 DIAGNOSIS — M26.4 MALOCCLUSION: ICD-10-CM

## 2023-08-11 DIAGNOSIS — F33.1 MODERATE EPISODE OF RECURRENT MAJOR DEPRESSIVE DISORDER (MULTI): ICD-10-CM

## 2023-08-11 DIAGNOSIS — Z01.818 PREOPERATIVE CLEARANCE: Primary | ICD-10-CM

## 2023-08-11 DIAGNOSIS — K02.9 DENTAL CARIES: ICD-10-CM

## 2023-08-11 DIAGNOSIS — F41.1 GENERALIZED ANXIETY DISORDER: ICD-10-CM

## 2023-08-11 DIAGNOSIS — K50.00 CROHN'S DISEASE OF SMALL INTESTINE WITHOUT COMPLICATION (MULTI): ICD-10-CM

## 2023-08-11 PROCEDURE — 1036F TOBACCO NON-USER: CPT | Performed by: FAMILY MEDICINE

## 2023-08-11 PROCEDURE — 99243 OFF/OP CNSLTJ NEW/EST LOW 30: CPT | Performed by: FAMILY MEDICINE

## 2023-08-11 RX ORDER — FLUOXETINE HYDROCHLORIDE 40 MG/1
40 CAPSULE ORAL DAILY
Qty: 90 CAPSULE | Refills: 1 | Status: SHIPPED | OUTPATIENT
Start: 2023-08-11 | End: 2024-03-08 | Stop reason: SDUPTHER

## 2023-08-11 RX ORDER — CLINDAMYCIN HYDROCHLORIDE 150 MG/1
150 CAPSULE ORAL DAILY
COMMUNITY
Start: 2023-07-31 | End: 2023-12-06 | Stop reason: HOSPADM

## 2023-08-11 ASSESSMENT — PATIENT HEALTH QUESTIONNAIRE - PHQ9
1. LITTLE INTEREST OR PLEASURE IN DOING THINGS: NOT AT ALL
2. FEELING DOWN, DEPRESSED OR HOPELESS: NOT AT ALL
SUM OF ALL RESPONSES TO PHQ9 QUESTIONS 1 AND 2: 0

## 2023-08-11 NOTE — PROGRESS NOTES
Chief Complaint  Patient presents with   Pre-op Exam    This patient was referred to me by the Dentist for pre-op evaluation prior to dental procedure for dental implants which is scheduled for 9/11/2023 at [facility].       Past Medical History:   Diagnosis Date    Personal history of diseases of the blood and blood-forming organs and certain disorders involving the immune mechanism     History of anemia    Personal history of other diseases of the digestive system     History of Crohn's disease    Personal history of other diseases of the female genital tract     History of endometriosis       Past Surgical History:   Procedure Laterality Date    OTHER SURGICAL HISTORY  09/02/2020    Ablation    OTHER SURGICAL HISTORY  09/02/2020    Dilation and curettage    OTHER SURGICAL HISTORY  10/07/2020    Colectomy    OTHER SURGICAL HISTORY  04/14/2021    Endoscopy    OTHER SURGICAL HISTORY  04/14/2021    Colonoscopy       Social History     Socioeconomic History    Marital status:      Spouse name: Not on file    Number of children: Not on file    Years of education: Not on file    Highest education level: Not on file   Occupational History    Not on file   Tobacco Use    Smoking status: Former     Types: Cigarettes    Smokeless tobacco: Never   Vaping Use    Vaping Use: Never used   Substance and Sexual Activity    Alcohol use: Not Currently    Drug use: Never    Sexual activity: Defer   Other Topics Concern    Not on file   Social History Narrative    Not on file     Social Determinants of Health     Financial Resource Strain: Not on file   Food Insecurity: Not on file   Transportation Needs: Not on file   Physical Activity: Not on file   Stress: Not on file   Social Connections: Not on file   Intimate Partner Violence: Not on file   Housing Stability: Not on file       Prior to Admission medications    Medication Sig Start Date End Date Taking? Authorizing Provider   ARIPiprazole (Abilify) 2 mg tablet Take 1  tablet (2 mg) by mouth once daily. 4/7/23 8/11/23 Yes Henry Wallace MD   clindamycin (Cleocin) 150 mg capsule Take 1 capsule (150 mg) by mouth once daily. 7/31/23  Yes Historical Provider, MD   FLUoxetine (PROzac) 40 mg capsule Take 1 capsule (40 mg) by mouth once daily. 4/7/23 8/11/23 Yes Henry Wallace MD   ibuprofen (Motrin) capsule Take 1 capsule (200 mg) by mouth 2 times a day.   Yes Historical Provider, MD   multivitamin tablet Take 1 tablet by mouth once daily.   Yes Historical Provider, MD   omeprazole (PriLOSEC) 40 mg DR capsule Take 1 capsule (40 mg) by mouth once daily in the morning. Take before meals. 4/7/23 8/11/23 Yes Henry Wallace MD   ondansetron (Zofran) 4 mg tablet Take 1 tablet (4 mg) by mouth every 6 hours. 8/3/21  Yes Historical Provider, MD   pimecrolimus (Elidel) 1 % cream Apply 1 Application topically twice a day. 10/20/21  Yes Historical Provider, MD       Allergies   Allergen Reactions    Cefaclor Hives       Review of System   General ROS: negative for - fatigue, fever, malaise, night sweats, sleep disturbance or weight loss  Psychological ROS: negative for - anxiety, concentration difficulties, depression, memory difficulties or sleep disturbances  ENT ROS: negative for - hearing change, nasal discharge, oral lesions, sinus pain, sore throat, tinnitus or vertigo  Allergy and Immunology ROS: negative for - hives, nasal congestion or seasonal allergies  Hematological and Lymphatic ROS: negative for - bruising, fatigue, night sweats or pallor  Endocrine ROS: negative for - hot flashes, malaise/lethargy, palpitations, polydipsia/polyuria, skin changes, temperature intolerance or unexpected weight changes  Respiratory ROS: negative for - cough, hemoptysis, pleuritic pain, shortness of breath or wheezing  Cardiovascular ROS: no chest pain or dyspnea on exertion  Gastrointestinal ROS: no abdominal pain, change in bowel habits, or black or bloody stools  Genito-Urinary ROS: no dysuria,  "trouble voiding, or hematuria  Musculoskeletal ROS: negative for - joint pain, joint stiffness, joint swelling, muscle pain or muscular weakness  Neurological ROS: negative for - dizziness, gait disturbance, headaches, impaired coordination/balance, numbness/tingling, tremors or visual changes  Dermatological ROS: negative for - dry skin, lumps, pruritus or rash    This patient has never experienced difficulty with general anesthetic.The patient has never experienced difficulty with general anesthetic. No history of blood transfusion reaction, blood clots or excessive bleeding    Blood pressure 110/60, pulse 66, temperature 36.4 °C (97.6 °F), resp. rate 26, height 1.575 m (5' 2\"), weight 60.5 kg (133 lb 6.4 oz), SpO2 98 %.    Physical Examination: General appearance - alert, well appearing, and in no distress  Mental status - alert, oriented to person, place, and time  Neck - Neck is supple, no JVD or carotid bruits.  No thyromegaly or adenopathy.   Chest - clear to auscultation, no wheezes, rales or rhonchi, symmetric air entry  Heart - normal rate, regular rhythm, normal S1, S2, no murmurs, rubs, clicks or gallops  Abdomen - soft, nontender, nondistended, no masses or organomegaly  Neurological - alert, oriented, normal speech, no focal findings or movement disorder noted  Musculoskeletal - no joint tenderness, deformity or swelling  Extremities - peripheral pulses normal, no pedal edema, no clubbing or cyanosis  Skin - normal coloration and turgor, no rashes, no suspicious skin lesions noted      1. Preoperative clearance        2. Dental caries        3. Malocclusion        4. Generalized anxiety disorder  FLUoxetine (PROzac) 40 mg capsule      5. Moderate episode of recurrent major depressive disorder (CMS/HCC)  FLUoxetine (PROzac) 40 mg capsule      6. Crohn's disease of small intestine without complication (CMS/HCC)              There are no contraindications to proceed with surgery. Acceptable risk from a " cardiopulmonary/medical standpoint. Will notify referring surgeon. Recommend routine DVT/PE prophylaxis as per surgery.    Scribe Attestation  By signing my name below, I, Kilo Concepcion   attest that this documentation has been prepared under the direction and in the presence of Henry Wallace MD.

## 2023-08-12 DIAGNOSIS — F41.1 GENERALIZED ANXIETY DISORDER: ICD-10-CM

## 2023-08-12 DIAGNOSIS — F33.1 MODERATE EPISODE OF RECURRENT MAJOR DEPRESSIVE DISORDER (MULTI): ICD-10-CM

## 2023-08-18 ENCOUNTER — LAB (OUTPATIENT)
Dept: LAB | Facility: LAB | Age: 43
End: 2023-08-18
Payer: COMMERCIAL

## 2023-08-18 ENCOUNTER — APPOINTMENT (OUTPATIENT)
Dept: PRIMARY CARE | Facility: CLINIC | Age: 43
End: 2023-08-18
Payer: COMMERCIAL

## 2023-08-18 DIAGNOSIS — D72.829 LEUKOCYTOSIS, UNSPECIFIED TYPE: ICD-10-CM

## 2023-08-18 LAB
BASOPHILS (10*3/UL) IN BLOOD BY AUTOMATED COUNT: 0.04 X10E9/L (ref 0–0.1)
BASOPHILS/100 LEUKOCYTES IN BLOOD BY AUTOMATED COUNT: 0.3 % (ref 0–2)
EOSINOPHILS (10*3/UL) IN BLOOD BY AUTOMATED COUNT: 0.34 X10E9/L (ref 0–0.7)
EOSINOPHILS/100 LEUKOCYTES IN BLOOD BY AUTOMATED COUNT: 2.6 % (ref 0–6)
ERYTHROCYTE DISTRIBUTION WIDTH (RATIO) BY AUTOMATED COUNT: 13.3 % (ref 11.5–14.5)
ERYTHROCYTE MEAN CORPUSCULAR HEMOGLOBIN CONCENTRATION (G/DL) BY AUTOMATED: 32.2 G/DL (ref 32–36)
ERYTHROCYTE MEAN CORPUSCULAR VOLUME (FL) BY AUTOMATED COUNT: 92 FL (ref 80–100)
ERYTHROCYTES (10*6/UL) IN BLOOD BY AUTOMATED COUNT: 4.48 X10E12/L (ref 4–5.2)
HEMATOCRIT (%) IN BLOOD BY AUTOMATED COUNT: 41.3 % (ref 36–46)
HEMOGLOBIN (G/DL) IN BLOOD: 13.3 G/DL (ref 12–16)
IMMATURE GRANULOCYTES/100 LEUKOCYTES IN BLOOD BY AUTOMATED COUNT: 0.5 % (ref 0–0.9)
LEUKOCYTES (10*3/UL) IN BLOOD BY AUTOMATED COUNT: 13.1 X10E9/L (ref 4.4–11.3)
LYMPHOCYTES (10*3/UL) IN BLOOD BY AUTOMATED COUNT: 2.7 X10E9/L (ref 1.2–4.8)
LYMPHOCYTES/100 LEUKOCYTES IN BLOOD BY AUTOMATED COUNT: 20.7 % (ref 13–44)
MONOCYTES (10*3/UL) IN BLOOD BY AUTOMATED COUNT: 0.63 X10E9/L (ref 0.1–1)
MONOCYTES/100 LEUKOCYTES IN BLOOD BY AUTOMATED COUNT: 4.8 % (ref 2–10)
NEUTROPHILS (10*3/UL) IN BLOOD BY AUTOMATED COUNT: 9.28 X10E9/L (ref 1.2–7.7)
NEUTROPHILS/100 LEUKOCYTES IN BLOOD BY AUTOMATED COUNT: 71.1 % (ref 40–80)
PLATELETS (10*3/UL) IN BLOOD AUTOMATED COUNT: 359 X10E9/L (ref 150–450)

## 2023-08-18 PROCEDURE — 36415 COLL VENOUS BLD VENIPUNCTURE: CPT

## 2023-08-18 PROCEDURE — 85025 COMPLETE CBC W/AUTO DIFF WBC: CPT

## 2023-08-21 DIAGNOSIS — R53.82 CHRONIC FATIGUE: ICD-10-CM

## 2023-12-03 ENCOUNTER — APPOINTMENT (OUTPATIENT)
Dept: RADIOLOGY | Facility: HOSPITAL | Age: 43
End: 2023-12-03
Payer: COMMERCIAL

## 2023-12-03 ENCOUNTER — APPOINTMENT (OUTPATIENT)
Dept: CARDIOLOGY | Facility: HOSPITAL | Age: 43
End: 2023-12-03
Payer: COMMERCIAL

## 2023-12-03 ENCOUNTER — HOSPITAL ENCOUNTER (OUTPATIENT)
Facility: HOSPITAL | Age: 43
Setting detail: OBSERVATION
Discharge: HOME | End: 2023-12-06
Attending: EMERGENCY MEDICINE | Admitting: INTERNAL MEDICINE
Payer: COMMERCIAL

## 2023-12-03 DIAGNOSIS — K91.89 ANASTOMOTIC STRICTURE OF SMALL INTESTINE: ICD-10-CM

## 2023-12-03 DIAGNOSIS — R93.5 ABNORMAL CT OF THE ABDOMEN: ICD-10-CM

## 2023-12-03 DIAGNOSIS — K91.89 ANASTOMOTIC STRICTURE OF ESOPHAGUS: ICD-10-CM

## 2023-12-03 DIAGNOSIS — K91.30 ANASTOMOTIC STRICTURE OF SMALL INTESTINE: ICD-10-CM

## 2023-12-03 DIAGNOSIS — D72.829 LEUKOCYTOSIS, UNSPECIFIED TYPE: ICD-10-CM

## 2023-12-03 DIAGNOSIS — R10.84 GENERALIZED ABDOMINAL PAIN: Primary | ICD-10-CM

## 2023-12-03 DIAGNOSIS — K22.2 ANASTOMOTIC STRICTURE OF ESOPHAGUS: ICD-10-CM

## 2023-12-03 PROBLEM — K50.019: Status: ACTIVE | Noted: 2023-12-03

## 2023-12-03 LAB
ALBUMIN SERPL BCP-MCNC: 4.2 G/DL (ref 3.4–5)
ALP SERPL-CCNC: 72 U/L (ref 33–110)
ALT SERPL W P-5'-P-CCNC: 20 U/L (ref 7–45)
ANION GAP SERPL CALC-SCNC: 11 MMOL/L (ref 10–20)
AST SERPL W P-5'-P-CCNC: 13 U/L (ref 9–39)
BASOPHILS # BLD AUTO: 0.03 X10*3/UL (ref 0–0.1)
BASOPHILS NFR BLD AUTO: 0.2 %
BILIRUB DIRECT SERPL-MCNC: 0.1 MG/DL (ref 0–0.3)
BILIRUB SERPL-MCNC: 0.8 MG/DL (ref 0–1.2)
BUN SERPL-MCNC: 5 MG/DL (ref 6–23)
CALCIUM SERPL-MCNC: 8.9 MG/DL (ref 8.6–10.3)
CARDIAC TROPONIN I PNL SERPL HS: <3 NG/L (ref 0–13)
CHLORIDE SERPL-SCNC: 105 MMOL/L (ref 98–107)
CO2 SERPL-SCNC: 25 MMOL/L (ref 21–32)
CREAT SERPL-MCNC: 0.62 MG/DL (ref 0.5–1.05)
CRP SERPL-MCNC: 0.17 MG/DL
EOSINOPHIL # BLD AUTO: 0.09 X10*3/UL (ref 0–0.7)
EOSINOPHIL NFR BLD AUTO: 0.5 %
ERYTHROCYTE [DISTWIDTH] IN BLOOD BY AUTOMATED COUNT: 12.9 % (ref 11.5–14.5)
ERYTHROCYTE [SEDIMENTATION RATE] IN BLOOD BY WESTERGREN METHOD: 10 MM/H (ref 0–20)
GFR SERPL CREATININE-BSD FRML MDRD: >90 ML/MIN/1.73M*2
GLUCOSE SERPL-MCNC: 124 MG/DL (ref 74–99)
HCT VFR BLD AUTO: 41.4 % (ref 36–46)
HGB BLD-MCNC: 13.8 G/DL (ref 12–16)
IMM GRANULOCYTES # BLD AUTO: 0.08 X10*3/UL (ref 0–0.7)
IMM GRANULOCYTES NFR BLD AUTO: 0.4 % (ref 0–0.9)
LACTATE SERPL-SCNC: 0.9 MMOL/L (ref 0.4–2)
LIPASE SERPL-CCNC: 22 U/L (ref 9–82)
LYMPHOCYTES # BLD AUTO: 1.29 X10*3/UL (ref 1.2–4.8)
LYMPHOCYTES NFR BLD AUTO: 6.8 %
MAGNESIUM SERPL-MCNC: 1.99 MG/DL (ref 1.6–2.4)
MCH RBC QN AUTO: 29.6 PG (ref 26–34)
MCHC RBC AUTO-ENTMCNC: 33.3 G/DL (ref 32–36)
MCV RBC AUTO: 89 FL (ref 80–100)
MONOCYTES # BLD AUTO: 0.62 X10*3/UL (ref 0.1–1)
MONOCYTES NFR BLD AUTO: 3.3 %
NEUTROPHILS # BLD AUTO: 16.75 X10*3/UL (ref 1.2–7.7)
NEUTROPHILS NFR BLD AUTO: 88.8 %
NRBC BLD-RTO: 0 /100 WBCS (ref 0–0)
PLATELET # BLD AUTO: 327 X10*3/UL (ref 150–450)
POTASSIUM SERPL-SCNC: 3.5 MMOL/L (ref 3.5–5.3)
PROT SERPL-MCNC: 6.5 G/DL (ref 6.4–8.2)
RBC # BLD AUTO: 4.66 X10*6/UL (ref 4–5.2)
SODIUM SERPL-SCNC: 137 MMOL/L (ref 136–145)
WBC # BLD AUTO: 18.9 X10*3/UL (ref 4.4–11.3)

## 2023-12-03 PROCEDURE — 83735 ASSAY OF MAGNESIUM: CPT | Performed by: EMERGENCY MEDICINE

## 2023-12-03 PROCEDURE — 86140 C-REACTIVE PROTEIN: CPT | Performed by: INTERNAL MEDICINE

## 2023-12-03 PROCEDURE — 85025 COMPLETE CBC W/AUTO DIFF WBC: CPT | Performed by: EMERGENCY MEDICINE

## 2023-12-03 PROCEDURE — 2500000001 HC RX 250 WO HCPCS SELF ADMINISTERED DRUGS (ALT 637 FOR MEDICARE OP): Performed by: INTERNAL MEDICINE

## 2023-12-03 PROCEDURE — 2500000004 HC RX 250 GENERAL PHARMACY W/ HCPCS (ALT 636 FOR OP/ED): Performed by: INTERNAL MEDICINE

## 2023-12-03 PROCEDURE — 96375 TX/PRO/DX INJ NEW DRUG ADDON: CPT

## 2023-12-03 PROCEDURE — 96361 HYDRATE IV INFUSION ADD-ON: CPT

## 2023-12-03 PROCEDURE — 36415 COLL VENOUS BLD VENIPUNCTURE: CPT | Performed by: EMERGENCY MEDICINE

## 2023-12-03 PROCEDURE — 80053 COMPREHEN METABOLIC PANEL: CPT | Performed by: EMERGENCY MEDICINE

## 2023-12-03 PROCEDURE — G0378 HOSPITAL OBSERVATION PER HR: HCPCS

## 2023-12-03 PROCEDURE — 74176 CT ABD & PELVIS W/O CONTRAST: CPT | Mod: FR

## 2023-12-03 PROCEDURE — 93005 ELECTROCARDIOGRAM TRACING: CPT

## 2023-12-03 PROCEDURE — 84484 ASSAY OF TROPONIN QUANT: CPT | Performed by: EMERGENCY MEDICINE

## 2023-12-03 PROCEDURE — 96372 THER/PROPH/DIAG INJ SC/IM: CPT | Performed by: INTERNAL MEDICINE

## 2023-12-03 PROCEDURE — 2500000004 HC RX 250 GENERAL PHARMACY W/ HCPCS (ALT 636 FOR OP/ED): Performed by: EMERGENCY MEDICINE

## 2023-12-03 PROCEDURE — 83605 ASSAY OF LACTIC ACID: CPT | Performed by: EMERGENCY MEDICINE

## 2023-12-03 PROCEDURE — 83690 ASSAY OF LIPASE: CPT | Performed by: EMERGENCY MEDICINE

## 2023-12-03 PROCEDURE — 99285 EMERGENCY DEPT VISIT HI MDM: CPT | Mod: 25 | Performed by: EMERGENCY MEDICINE

## 2023-12-03 PROCEDURE — 74176 CT ABD & PELVIS W/O CONTRAST: CPT | Mod: FOREIGN READ | Performed by: RADIOLOGY

## 2023-12-03 PROCEDURE — 99222 1ST HOSP IP/OBS MODERATE 55: CPT | Performed by: INTERNAL MEDICINE

## 2023-12-03 PROCEDURE — 96365 THER/PROPH/DIAG IV INF INIT: CPT

## 2023-12-03 PROCEDURE — 96376 TX/PRO/DX INJ SAME DRUG ADON: CPT

## 2023-12-03 PROCEDURE — 82248 BILIRUBIN DIRECT: CPT | Performed by: EMERGENCY MEDICINE

## 2023-12-03 PROCEDURE — 96367 TX/PROPH/DG ADDL SEQ IV INF: CPT

## 2023-12-03 PROCEDURE — 96366 THER/PROPH/DIAG IV INF ADDON: CPT

## 2023-12-03 PROCEDURE — 85652 RBC SED RATE AUTOMATED: CPT | Performed by: INTERNAL MEDICINE

## 2023-12-03 RX ORDER — CIPROFLOXACIN 2 MG/ML
400 INJECTION, SOLUTION INTRAVENOUS EVERY 12 HOURS
Status: DISCONTINUED | OUTPATIENT
Start: 2023-12-03 | End: 2023-12-06 | Stop reason: HOSPADM

## 2023-12-03 RX ORDER — MORPHINE SULFATE 4 MG/ML
4 INJECTION, SOLUTION INTRAMUSCULAR; INTRAVENOUS ONCE
Status: COMPLETED | OUTPATIENT
Start: 2023-12-03 | End: 2023-12-03

## 2023-12-03 RX ORDER — CIPROFLOXACIN 2 MG/ML
400 INJECTION, SOLUTION INTRAVENOUS EVERY 12 HOURS
Status: DISCONTINUED | OUTPATIENT
Start: 2023-12-03 | End: 2023-12-03

## 2023-12-03 RX ORDER — ACETAMINOPHEN 650 MG/1
650 SUPPOSITORY RECTAL EVERY 4 HOURS PRN
Status: DISCONTINUED | OUTPATIENT
Start: 2023-12-03 | End: 2023-12-06 | Stop reason: HOSPADM

## 2023-12-03 RX ORDER — CIPROFLOXACIN 2 MG/ML
400 INJECTION, SOLUTION INTRAVENOUS ONCE
Status: COMPLETED | OUTPATIENT
Start: 2023-12-03 | End: 2023-12-03

## 2023-12-03 RX ORDER — ONDANSETRON 4 MG/1
4 TABLET, FILM COATED ORAL EVERY 8 HOURS PRN
Status: DISCONTINUED | OUTPATIENT
Start: 2023-12-03 | End: 2023-12-06 | Stop reason: HOSPADM

## 2023-12-03 RX ORDER — ONDANSETRON HYDROCHLORIDE 2 MG/ML
4 INJECTION, SOLUTION INTRAVENOUS ONCE
Status: COMPLETED | OUTPATIENT
Start: 2023-12-03 | End: 2023-12-03

## 2023-12-03 RX ORDER — ENOXAPARIN SODIUM 100 MG/ML
40 INJECTION SUBCUTANEOUS EVERY 24 HOURS
Status: DISCONTINUED | OUTPATIENT
Start: 2023-12-03 | End: 2023-12-06 | Stop reason: HOSPADM

## 2023-12-03 RX ORDER — FLUOXETINE HYDROCHLORIDE 20 MG/1
40 CAPSULE ORAL DAILY
Status: DISCONTINUED | OUTPATIENT
Start: 2023-12-03 | End: 2023-12-06 | Stop reason: HOSPADM

## 2023-12-03 RX ORDER — SODIUM CHLORIDE, SODIUM LACTATE, POTASSIUM CHLORIDE, CALCIUM CHLORIDE 600; 310; 30; 20 MG/100ML; MG/100ML; MG/100ML; MG/100ML
60 INJECTION, SOLUTION INTRAVENOUS CONTINUOUS
Status: DISCONTINUED | OUTPATIENT
Start: 2023-12-03 | End: 2023-12-06 | Stop reason: HOSPADM

## 2023-12-03 RX ORDER — MORPHINE SULFATE 4 MG/ML
4 INJECTION, SOLUTION INTRAMUSCULAR; INTRAVENOUS EVERY 4 HOURS PRN
Status: DISCONTINUED | OUTPATIENT
Start: 2023-12-03 | End: 2023-12-04

## 2023-12-03 RX ORDER — ACETAMINOPHEN 160 MG/5ML
650 SOLUTION ORAL EVERY 4 HOURS PRN
Status: DISCONTINUED | OUTPATIENT
Start: 2023-12-03 | End: 2023-12-06 | Stop reason: HOSPADM

## 2023-12-03 RX ORDER — METRONIDAZOLE 500 MG/100ML
500 INJECTION, SOLUTION INTRAVENOUS ONCE
Status: COMPLETED | OUTPATIENT
Start: 2023-12-03 | End: 2023-12-03

## 2023-12-03 RX ORDER — METRONIDAZOLE 500 MG/100ML
500 INJECTION, SOLUTION INTRAVENOUS EVERY 8 HOURS
Status: DISCONTINUED | OUTPATIENT
Start: 2023-12-03 | End: 2023-12-03

## 2023-12-03 RX ORDER — ONDANSETRON HYDROCHLORIDE 2 MG/ML
4 INJECTION, SOLUTION INTRAVENOUS EVERY 8 HOURS PRN
Status: DISCONTINUED | OUTPATIENT
Start: 2023-12-03 | End: 2023-12-06 | Stop reason: HOSPADM

## 2023-12-03 RX ORDER — ACETAMINOPHEN 325 MG/1
650 TABLET ORAL EVERY 4 HOURS PRN
Status: DISCONTINUED | OUTPATIENT
Start: 2023-12-03 | End: 2023-12-06 | Stop reason: HOSPADM

## 2023-12-03 RX ORDER — METRONIDAZOLE 500 MG/100ML
500 INJECTION, SOLUTION INTRAVENOUS EVERY 8 HOURS
Status: DISCONTINUED | OUTPATIENT
Start: 2023-12-03 | End: 2023-12-06 | Stop reason: HOSPADM

## 2023-12-03 RX ORDER — MORPHINE SULFATE 2 MG/ML
2 INJECTION, SOLUTION INTRAMUSCULAR; INTRAVENOUS EVERY 4 HOURS PRN
Status: DISCONTINUED | OUTPATIENT
Start: 2023-12-03 | End: 2023-12-04

## 2023-12-03 RX ADMIN — MORPHINE SULFATE 4 MG: 4 INJECTION, SOLUTION INTRAMUSCULAR; INTRAVENOUS at 08:11

## 2023-12-03 RX ADMIN — ENOXAPARIN SODIUM 40 MG: 40 INJECTION SUBCUTANEOUS at 14:29

## 2023-12-03 RX ADMIN — SODIUM CHLORIDE 1000 ML: 9 INJECTION, SOLUTION INTRAVENOUS at 08:12

## 2023-12-03 RX ADMIN — FLUOXETINE HYDROCHLORIDE 40 MG: 20 CAPSULE ORAL at 14:23

## 2023-12-03 RX ADMIN — METRONIDAZOLE 500 MG: 500 INJECTION, SOLUTION INTRAVENOUS at 18:15

## 2023-12-03 RX ADMIN — CIPROFLOXACIN 400 MG: 400 INJECTION, SOLUTION INTRAVENOUS at 10:12

## 2023-12-03 RX ADMIN — CIPROFLOXACIN 400 MG: 400 INJECTION, SOLUTION INTRAVENOUS at 21:06

## 2023-12-03 RX ADMIN — MORPHINE SULFATE 4 MG: 4 INJECTION, SOLUTION INTRAMUSCULAR; INTRAVENOUS at 14:26

## 2023-12-03 RX ADMIN — METRONIDAZOLE 500 MG: 500 INJECTION, SOLUTION INTRAVENOUS at 10:58

## 2023-12-03 RX ADMIN — ONDANSETRON 4 MG: 2 INJECTION INTRAMUSCULAR; INTRAVENOUS at 08:30

## 2023-12-03 RX ADMIN — MORPHINE SULFATE 4 MG: 4 INJECTION, SOLUTION INTRAMUSCULAR; INTRAVENOUS at 18:35

## 2023-12-03 RX ADMIN — SODIUM CHLORIDE, POTASSIUM CHLORIDE, SODIUM LACTATE AND CALCIUM CHLORIDE 60 ML/HR: 600; 310; 30; 20 INJECTION, SOLUTION INTRAVENOUS at 13:36

## 2023-12-03 RX ADMIN — ONDANSETRON 4 MG: 2 INJECTION INTRAMUSCULAR; INTRAVENOUS at 16:25

## 2023-12-03 SDOH — HEALTH STABILITY: PHYSICAL HEALTH: ON AVERAGE, HOW MANY DAYS PER WEEK DO YOU ENGAGE IN MODERATE TO STRENUOUS EXERCISE (LIKE A BRISK WALK)?: 5 DAYS

## 2023-12-03 SDOH — SOCIAL STABILITY: SOCIAL INSECURITY: HAVE YOU HAD THOUGHTS OF HARMING ANYONE ELSE?: NO

## 2023-12-03 SDOH — SOCIAL STABILITY: SOCIAL INSECURITY: ABUSE: ADULT

## 2023-12-03 SDOH — SOCIAL STABILITY: SOCIAL INSECURITY: DOES ANYONE TRY TO KEEP YOU FROM HAVING/CONTACTING OTHER FRIENDS OR DOING THINGS OUTSIDE YOUR HOME?: NO

## 2023-12-03 SDOH — HEALTH STABILITY: MENTAL HEALTH: HOW MANY STANDARD DRINKS CONTAINING ALCOHOL DO YOU HAVE ON A TYPICAL DAY?: PATIENT DOES NOT DRINK

## 2023-12-03 SDOH — SOCIAL STABILITY: SOCIAL INSECURITY: HAS ANYONE EVER THREATENED TO HURT YOUR FAMILY OR YOUR PETS?: NO

## 2023-12-03 SDOH — ECONOMIC STABILITY: FOOD INSECURITY: WITHIN THE PAST 12 MONTHS, YOU WORRIED THAT YOUR FOOD WOULD RUN OUT BEFORE YOU GOT MONEY TO BUY MORE.: NEVER TRUE

## 2023-12-03 SDOH — HEALTH STABILITY: PHYSICAL HEALTH

## 2023-12-03 SDOH — HEALTH STABILITY: MENTAL HEALTH: HOW OFTEN DO YOU HAVE 6 OR MORE DRINKS ON ONE OCCASION?: NEVER

## 2023-12-03 SDOH — SOCIAL STABILITY: SOCIAL INSECURITY: WERE YOU ABLE TO COMPLETE ALL THE BEHAVIORAL HEALTH SCREENINGS?: YES

## 2023-12-03 SDOH — ECONOMIC STABILITY: INCOME INSECURITY: IN THE LAST 12 MONTHS, WAS THERE A TIME WHEN YOU WERE NOT ABLE TO PAY THE MORTGAGE OR RENT ON TIME?: NO

## 2023-12-03 SDOH — ECONOMIC STABILITY: HOUSING INSECURITY: IN THE LAST 12 MONTHS, HOW MANY PLACES HAVE YOU LIVED?: 1

## 2023-12-03 SDOH — ECONOMIC STABILITY: FOOD INSECURITY: WITHIN THE PAST 12 MONTHS, THE FOOD YOU BOUGHT JUST DIDN'T LAST AND YOU DIDN'T HAVE MONEY TO GET MORE.: NEVER TRUE

## 2023-12-03 SDOH — HEALTH STABILITY: MENTAL HEALTH
STRESS IS WHEN SOMEONE FEELS TENSE, NERVOUS, ANXIOUS, OR CAN'T SLEEP AT NIGHT BECAUSE THEIR MIND IS TROUBLED. HOW STRESSED ARE YOU?: TO SOME EXTENT

## 2023-12-03 SDOH — ECONOMIC STABILITY: INCOME INSECURITY: HOW HARD IS IT FOR YOU TO PAY FOR THE VERY BASICS LIKE FOOD, HOUSING, MEDICAL CARE, AND HEATING?: SOMEWHAT HARD

## 2023-12-03 SDOH — ECONOMIC STABILITY: TRANSPORTATION INSECURITY
IN THE PAST 12 MONTHS, HAS THE LACK OF TRANSPORTATION KEPT YOU FROM MEDICAL APPOINTMENTS OR FROM GETTING MEDICATIONS?: NO

## 2023-12-03 SDOH — SOCIAL STABILITY: SOCIAL INSECURITY: DO YOU FEEL ANYONE HAS EXPLOITED OR TAKEN ADVANTAGE OF YOU FINANCIALLY OR OF YOUR PERSONAL PROPERTY?: NO

## 2023-12-03 SDOH — SOCIAL STABILITY: SOCIAL INSECURITY: DO YOU FEEL UNSAFE GOING BACK TO THE PLACE WHERE YOU ARE LIVING?: NO

## 2023-12-03 SDOH — SOCIAL STABILITY: SOCIAL INSECURITY: ARE YOU OR HAVE YOU BEEN THREATENED OR ABUSED PHYSICALLY, EMOTIONALLY, OR SEXUALLY BY ANYONE?: NO

## 2023-12-03 SDOH — HEALTH STABILITY: MENTAL HEALTH: HOW OFTEN DO YOU HAVE A DRINK CONTAINING ALCOHOL?: NEVER

## 2023-12-03 SDOH — ECONOMIC STABILITY: HOUSING INSECURITY
IN THE LAST 12 MONTHS, WAS THERE A TIME WHEN YOU DID NOT HAVE A STEADY PLACE TO SLEEP OR SLEPT IN A SHELTER (INCLUDING NOW)?: NO

## 2023-12-03 SDOH — ECONOMIC STABILITY: TRANSPORTATION INSECURITY
IN THE PAST 12 MONTHS, HAS LACK OF TRANSPORTATION KEPT YOU FROM MEETINGS, WORK, OR FROM GETTING THINGS NEEDED FOR DAILY LIVING?: NO

## 2023-12-03 SDOH — SOCIAL STABILITY: SOCIAL INSECURITY: ARE THERE ANY APPARENT SIGNS OF INJURIES/BEHAVIORS THAT COULD BE RELATED TO ABUSE/NEGLECT?: NO

## 2023-12-03 SDOH — ECONOMIC STABILITY: INCOME INSECURITY: HOW HARD IS IT FOR YOU TO PAY FOR THE VERY BASICS LIKE FOOD, HOUSING, MEDICAL CARE, AND HEATING?: NOT HARD AT ALL

## 2023-12-03 ASSESSMENT — PAIN DESCRIPTION - ORIENTATION
ORIENTATION: RIGHT;UPPER
ORIENTATION: RIGHT;UPPER

## 2023-12-03 ASSESSMENT — COGNITIVE AND FUNCTIONAL STATUS - GENERAL
DAILY ACTIVITIY SCORE: 24
MOBILITY SCORE: 24
MOBILITY SCORE: 24
PATIENT BASELINE BEDBOUND: NO
DAILY ACTIVITIY SCORE: 24
MOBILITY SCORE: 24
DAILY ACTIVITIY SCORE: 24

## 2023-12-03 ASSESSMENT — PAIN DESCRIPTION - DESCRIPTORS
DESCRIPTORS: CRAMPING
DESCRIPTORS: STABBING;SHARP

## 2023-12-03 ASSESSMENT — ACTIVITIES OF DAILY LIVING (ADL)
LACK_OF_TRANSPORTATION: NO
TOILETING: INDEPENDENT
BATHING: INDEPENDENT
PATIENT'S MEMORY ADEQUATE TO SAFELY COMPLETE DAILY ACTIVITIES?: YES
HEARING - RIGHT EAR: FUNCTIONAL
HEARING - LEFT EAR: FUNCTIONAL
HEARING - LEFT EAR: FUNCTIONAL
GROOMING: INDEPENDENT
HEARING - RIGHT EAR: FUNCTIONAL
WALKS IN HOME: INDEPENDENT
FEEDING YOURSELF: INDEPENDENT
DRESSING YOURSELF: INDEPENDENT
BATHING: INDEPENDENT
ADEQUATE_TO_COMPLETE_ADL: YES
PATIENT'S MEMORY ADEQUATE TO SAFELY COMPLETE DAILY ACTIVITIES?: YES
TOILETING: INDEPENDENT
WALKS IN HOME: INDEPENDENT
JUDGMENT_ADEQUATE_SAFELY_COMPLETE_DAILY_ACTIVITIES: YES
FEEDING YOURSELF: INDEPENDENT
LACK_OF_TRANSPORTATION: NO
DRESSING YOURSELF: INDEPENDENT
GROOMING: INDEPENDENT
JUDGMENT_ADEQUATE_SAFELY_COMPLETE_DAILY_ACTIVITIES: YES
ADEQUATE_TO_COMPLETE_ADL: YES

## 2023-12-03 ASSESSMENT — PAIN DESCRIPTION - LOCATION
LOCATION: ABDOMEN

## 2023-12-03 ASSESSMENT — PAIN DESCRIPTION - PAIN TYPE
TYPE: ACUTE PAIN
TYPE: ACUTE PAIN

## 2023-12-03 ASSESSMENT — PATIENT HEALTH QUESTIONNAIRE - PHQ9
SUM OF ALL RESPONSES TO PHQ9 QUESTIONS 1 & 2: 0
SUM OF ALL RESPONSES TO PHQ9 QUESTIONS 1 & 2: 0
1. LITTLE INTEREST OR PLEASURE IN DOING THINGS: NOT AT ALL
2. FEELING DOWN, DEPRESSED OR HOPELESS: NOT AT ALL
2. FEELING DOWN, DEPRESSED OR HOPELESS: NOT AT ALL
1. LITTLE INTEREST OR PLEASURE IN DOING THINGS: NOT AT ALL

## 2023-12-03 ASSESSMENT — COLUMBIA-SUICIDE SEVERITY RATING SCALE - C-SSRS
6. HAVE YOU EVER DONE ANYTHING, STARTED TO DO ANYTHING, OR PREPARED TO DO ANYTHING TO END YOUR LIFE?: NO
1. IN THE PAST MONTH, HAVE YOU WISHED YOU WERE DEAD OR WISHED YOU COULD GO TO SLEEP AND NOT WAKE UP?: NO
2. HAVE YOU ACTUALLY HAD ANY THOUGHTS OF KILLING YOURSELF?: NO
1. IN THE PAST MONTH, HAVE YOU WISHED YOU WERE DEAD OR WISHED YOU COULD GO TO SLEEP AND NOT WAKE UP?: NO
2. HAVE YOU ACTUALLY HAD ANY THOUGHTS OF KILLING YOURSELF?: NO
6. HAVE YOU EVER DONE ANYTHING, STARTED TO DO ANYTHING, OR PREPARED TO DO ANYTHING TO END YOUR LIFE?: NO

## 2023-12-03 ASSESSMENT — LIFESTYLE VARIABLES
SKIP TO QUESTIONS 9-10: 1
SKIP TO QUESTIONS 9-10: 1
EVER FELT BAD OR GUILTY ABOUT YOUR DRINKING: NO
HOW MANY STANDARD DRINKS CONTAINING ALCOHOL DO YOU HAVE ON A TYPICAL DAY: PATIENT DOES NOT DRINK
PRESCIPTION_ABUSE_PAST_12_MONTHS: NO
HOW OFTEN DO YOU HAVE A DRINK CONTAINING ALCOHOL: NEVER
HAVE YOU EVER FELT YOU SHOULD CUT DOWN ON YOUR DRINKING: NO
AUDIT-C TOTAL SCORE: 0
AUDIT-C TOTAL SCORE: 0
HAVE PEOPLE ANNOYED YOU BY CRITICIZING YOUR DRINKING: NO
EVER HAD A DRINK FIRST THING IN THE MORNING TO STEADY YOUR NERVES TO GET RID OF A HANGOVER: NO
AUDIT-C TOTAL SCORE: 0
HOW OFTEN DO YOU HAVE 6 OR MORE DRINKS ON ONE OCCASION: NEVER
REASON UNABLE TO ASSESS: NO
HOW OFTEN DO YOU HAVE 6 OR MORE DRINKS ON ONE OCCASION: NEVER
SKIP TO QUESTIONS 9-10: 1
HOW MANY STANDARD DRINKS CONTAINING ALCOHOL DO YOU HAVE ON A TYPICAL DAY: PATIENT DOES NOT DRINK
SUBSTANCE_ABUSE_PAST_12_MONTHS: NO
AUDIT-C TOTAL SCORE: 0
HOW OFTEN DO YOU HAVE A DRINK CONTAINING ALCOHOL: NEVER
AUDIT-C TOTAL SCORE: 0

## 2023-12-03 ASSESSMENT — PAIN - FUNCTIONAL ASSESSMENT
PAIN_FUNCTIONAL_ASSESSMENT: 0-10

## 2023-12-03 ASSESSMENT — PAIN DESCRIPTION - FREQUENCY: FREQUENCY: INTERMITTENT

## 2023-12-03 ASSESSMENT — PAIN SCALES - GENERAL
PAINLEVEL_OUTOF10: 9
PAINLEVEL_OUTOF10: 5 - MODERATE PAIN
PAINLEVEL_OUTOF10: 9
PAINLEVEL_OUTOF10: 7
PAINLEVEL_OUTOF10: 9
PAINLEVEL_OUTOF10: 5 - MODERATE PAIN
PAINLEVEL_OUTOF10: 3

## 2023-12-03 NOTE — CARE PLAN
The patient's goals for the shift include      The clinical goals for the shift include No nausea or vomiting    Over the shift, the patient did not make progress toward the following goals. Barriers to progression include ***. Recommendations to address these barriers include ***.

## 2023-12-03 NOTE — ED PROVIDER NOTES
HPI   Chief Complaint   Patient presents with    Abdominal Pain     RLQ, states that it feels like her endo. And/or crohn's.        HPI: 43-year-old female history of endometriosis as well as Crohn's disease states that she started to get right mid abdominal pain yesterday about 1 PM.  She states that she has had a previous surgery.  She states that she sees Dr. Rodriguez as her gastroenterologist.  She states that she has not been on medications for her Crohn's disease in a few years.  She states that she chronically has diarrhea.  She denies any blood in her stools or dark tarry stools.  She denies being on a blood thinners.  She denies pregnancy    Family HX: Denies any significant/pertinent family history.  Social Hx: Denies ETOH or drug use.  Review of systems:  Gen.: No weight loss, fatigue, anorexia, insomnia, fever.   Eyes: No vision loss, double vision, drainage, eye pain.   ENT: No pharyngitis, dry mouth.   Cardiac: No chest pain, palpitations, syncope, near syncope.   Pulmonary: No shortness of breath, cough, hemoptysis.   Heme/lymph: No swollen glands, fever, bleeding.   GI: No  change in bowel habits, melena, hematemesis, hematochezia, nausea, vomiting, diarrhea.   : No discharge, dysuria, frequency, urgency, hematuria.   Musculoskeletal: No limb pain, joint pain, joint swelling.   Skin: No rashes.   Psych: No depression, anxiety, suicidality, homicidality.   Review of systems is otherwise negative unless stated above or in history of present illness.    Physical Exam:    Appearance: Alert, oriented , cooperative,  in no acute distress. Well nourished & well hydrated.    Skin: Intact,  dry skin, no lesions, rash, petechiae or purpura.     Eyes: PERRLA, EOMs intact,  Conjunctiva pink with no redness or exudates. Eyelids without lesions. No scleral icterus.     ENT: Hearing grossly intact. External auditory canals patent, tympanic membranes intact with visible landmarks. Nares patent, mucus membranes moist.  Dentition without lesions. Pharynx clear, uvula midline.     Neck: Supple, without meningismus. Thyroid not palpable. Trachea at midline. No lymphadenopathy.    Pulmonary: Clear bilaterally with good chest wall excursion. No rales, rhonchi or wheezing. No accessory muscle use or stridor.    Cardiac: Normal S1, S2 without murmur, rub, gallop or extrasystole. No JVD, Carotids without bruits.    Abdomen: Soft, mild tenderness at the umbilicus but no rebound or guarding active bowel sounds.  No palpable organomegaly.  No rebound or guarding.  No CVA tenderness.    Genitourinary: Exam deferred.    Musculoskeletal: Full range of motion. no pain, edema, or deformity. Pulses full and equal. No cyanosis, clubbing, or edema.    Neurological:  Cranial nerves II through XII are grossly intact, finger-nose touch is normal, normal sensation, no weakness, no focal findings identified.    Psychiatric: Appropriate mood and affect.     Medical Decision-Making:  Testing: EKG was obtained which is interpreted by me shows a sinus rhythm rate of 77 without evidence of obvious ST elevations or T wave inversions indicate acute ischemia or infarct.  Labs are reviewed.  Patient was found to have a leukocytosis.  CT scan was obtained and read by radiology as mild to moderate wall thickening of approximately 7 cm of distal small bowel extending to the ileocolonic anastomosis with more proximal fluid-filled mildly dilated loops of small bowel measuring up to approximately 3 cm.  Overall appearance is similar compared to 8/14/2020.  Given these findings I did speak to GI.  I spoke to the nurse practitioner covering for Dr. Louise.  She states that Dr. Hughes has done some dilation of this area in the past.  The patient states that it was approximately a year and a half ago.  She was empirically started on antibiotics and admitted to medicine as I was concerned that she was having abdominal pain with a history of Crohn's although she is not on any  current immunosuppressive's and has a leukocytosis.  She does not have an acute abdomen but she will be admitted for observation.  Is allergic to cefaclor therefore she was started empirically on Cipro and Flagyl  Treatment:   Reevaluation:   Plan: Admit Patient and family/friend/caregiver are in agreement with this plan.   Impression:   1.  Abdominal pain  2.  Leukocytosis  Labs Reviewed  BASIC METABOLIC PANEL - Abnormal     Glucose                       124 (*)                Sodium                        137                    Potassium                     3.5                    Chloride                      105                    Bicarbonate                   25                     Anion Gap                     11                     Urea Nitrogen                 5 (*)                  Creatinine                    0.62                   eGFR                          >90                    Calcium                       8.9                 CBC WITH AUTO DIFFERENTIAL - Abnormal     WBC                           18.9 (*)               nRBC                          0.0                    RBC                           4.66                   Hemoglobin                    13.8                   Hematocrit                    41.4                   MCV                           89                     MCH                           29.6                   MCHC                          33.3                   RDW                           12.9                   Platelets                     327                    Neutrophils %                 88.8                   Immature Granulocytes %, Automated   0.4                    Lymphocytes %                 6.8                    Monocytes %                   3.3                    Eosinophils %                 0.5                    Basophils %                   0.2                    Neutrophils Absolute          16.75 (*)               Immature Granulocytes Absolute, Au*    0.08                   Lymphocytes Absolute          1.29                   Monocytes Absolute            0.62                   Eosinophils Absolute          0.09                   Basophils Absolute            0.03                LIPASE - Normal     Lipase                        22                       Narrative: Venipuncture immediately after or during the administration of Metamizole may lead to falsely low results. Testing should be performed immediately prior to Metamizole dosing.  LACTATE - Normal     Lactate                       0.9                      Narrative: Venipuncture immediately after or during the administration of Metamizole may lead to falsely low results. Testing should be performed immediately                prior to Metamizole dosing.  HEPATIC FUNCTION PANEL - Normal     Albumin                       4.2                    Bilirubin, Total              0.8                    Bilirubin, Direct             0.1                    Alkaline Phosphatase          72                     ALT                           20                     AST                           13                     Total Protein                 6.5                 MAGNESIUM - Normal     Magnesium                     1.99                TROPONIN I, HIGH SENSITIVITY - Normal     Troponin I, High Sensitivity   <3                       Narrative: Less than 99th percentile of normal range cutoff-                Female and children under 18 years old <14 ng/L; Male <21 ng/L: Negative                Repeat testing should be performed if clinically indicated.                                 Female and children under 18 years old 14-50 ng/L; Male 21-50 ng/L:                Consistent with possible cardiac damage and possible increased clinical                 risk. Serial measurements may help to assess extent of myocardial damage.                                 >50 ng/L: Consistent with cardiac damage, increased clinical risk and                 myocardial infarction. Serial measurements may help assess extent of                 myocardial damage.                                  NOTE: Children less than 1 year old may have higher baseline troponin                 levels and results should be interpreted in conjunction with the overall                 clinical context.                                 NOTE: Troponin I testing is performed using a different                 testing methodology at [facility] than at other                 [facility] hospitals. Direct result comparisons should only                 be made within the same method.  SEDIMENTATION RATE, AUTOMATED - Normal     Sedimentation Rate            10                  C-REACTIVE PROTEIN - Normal     C-Reactive Protein            0.17                C. DIFFICILE, PCR  STOOL PATHOGEN PANEL, PCR  CALPROTECTIN STOOL     CT abdomen pelvis wo IV contrast   Final Result    1. Mild to moderate wall thickening of approximately 7 cm of distal    small bowel extending to the ileocolonic anastomosis with more    proximal fluid-filled mildly dilated loops of small bowel measuring up    to approximately 3 cm. Overall appearance is similar compared to    8/14/2020. Findings compatible with an enteritis with associated ileus    and compatible with mild active Crohn's.    2. Small amount of pelvic free fluid.    3. Mild to moderate hepatic steatosis.    4. 2.2 cm left adnexal cyst.    Signed by Blanco Cervantes MD                                No data recorded                Patient History   Past Medical History:   Diagnosis Date    Personal history of diseases of the blood and blood-forming organs and certain disorders involving the immune mechanism     History of anemia    Personal history of other diseases of the digestive system     History of Crohn's disease    Personal history of other diseases of the female genital tract     History of endometriosis     Past Surgical History:    Procedure Laterality Date    OTHER SURGICAL HISTORY  09/02/2020    Ablation    OTHER SURGICAL HISTORY  09/02/2020    Dilation and curettage    OTHER SURGICAL HISTORY  10/07/2020    Colectomy    OTHER SURGICAL HISTORY  04/14/2021    Endoscopy    OTHER SURGICAL HISTORY  04/14/2021    Colonoscopy     No family history on file.  Social History     Tobacco Use    Smoking status: Every Day     Packs/day: 0.50     Years: 20.00     Additional pack years: 0.00     Total pack years: 10.00     Types: Cigarettes    Smokeless tobacco: Never   Vaping Use    Vaping Use: Never used   Substance Use Topics    Alcohol use: Not Currently    Drug use: Never       Physical Exam   ED Triage Vitals [12/03/23 0649]   Temp Heart Rate Resp BP   36.6 °C (97.9 °F) 103 17 126/66      SpO2 Temp Source Heart Rate Source Patient Position   (!) 7 % Temporal Monitor Sitting      BP Location FiO2 (%)     Right arm --       Physical Exam    ED Course & MDM   Diagnoses as of 12/03/23 1628   Generalized abdominal pain   Leukocytosis, unspecified type       Medical Decision Making      Procedure  Procedures     Kasey Beckham MD  12/03/23 163

## 2023-12-03 NOTE — H&P
Medical Group History and Physical    ASSESSMENT/PLAN:     Abdominal pain  Crohn's diease  Ileocolonic anastomosis stricture  -diagnosed ~20 years ago  -has been off medication past few years as she had been doing so well  -follows with Dr. Rodriguez  -has known ileocolonic anastomosis narrowing that has had to be dilated in the past  -CT here showing mild to moderate wall thickening of approximately 7 cm of distal small bowel extending to the ileocolonic anastomosis with more proximal fluid-filled mildly dilated loops of small bowel  -will check stool studies to r/o infectious etiology prior to starting any steroids  -GI consult; given small bowel dilation may need repeat dilation  -check inflammatory markers  -NPO  -IVF  -pain control    Endometriosis  Depression/anxiety  -resume home medications    VTE Prophylaxis: lovenox      HISTORY OF PRESENT ILLNESS:   Chief Complaint: Abdominal pain    History Of Present Illness:    Edilma Benitez is a 43 y.o. female with a significant past medical history of Crohn's disease, endometriosis, depression and anxiety, is present emergency department with abdominal pain that began about 1 PM yesterday.  She said it began on the right periumbilical area but has since spread to the left side of her abdomen as well.  She feels that the pain is similar to prior episodes of Crohn's.  She has had prior small bowel resection related to this and has an ileocolonic anastomosis that has required dilation with Dr. Randle in the past.  She has been off any medications for Crohn's for the past several years because she had been doing so well.  She has been having a little bit of diarrhea no blood.  Denies any fevers or chills.  No nausea or vomiting.  Has not been able to eat or drink anything however.  Denies any dysuria or frequency.  No chest pain or shortness of breath.       Review of systems: 10 point review of systems is otherwise negative except as mentioned above.    PAST HISTORIES:  "      Past Medical History:  She has a past medical history of Personal history of diseases of the blood and blood-forming organs and certain disorders involving the immune mechanism, Personal history of other diseases of the digestive system, and Personal history of other diseases of the female genital tract.    Past Surgical History:  She has a past surgical history that includes Other surgical history (09/02/2020); Other surgical history (09/02/2020); Other surgical history (10/07/2020); Other surgical history (04/14/2021); and Other surgical history (04/14/2021).      Social History:  She reports that she has been smoking cigarettes. She has a 10.00 pack-year smoking history. She has never used smokeless tobacco. She reports that she does not currently use alcohol. She reports that she does not use drugs.    Family History:  No family history on file.     Allergies:  Cefaclor      OBJECTIVE:       Last Recorded Vitals:  Vitals:    12/03/23 0829 12/03/23 1017 12/03/23 1100 12/03/23 1333   BP: 101/59 102/59 100/56    BP Location:       Patient Position:       Pulse: 76 74 76    Resp: 16 16 18    Temp:       TempSrc:       SpO2: 98% 98% 98%    Weight:    59 kg (130 lb)   Height:    1.575 m (5' 2.01\")       Last I/O:  No intake/output data recorded.    Physical Exam  Vitals reviewed.   Constitutional:       Appearance: Normal appearance.   HENT:      Head: Normocephalic and atraumatic.      Nose: Nose normal.      Mouth/Throat:      Mouth: Mucous membranes are moist.      Pharynx: Oropharynx is clear.   Eyes:      Extraocular Movements: Extraocular movements intact.      Conjunctiva/sclera: Conjunctivae normal.      Pupils: Pupils are equal, round, and reactive to light.   Cardiovascular:      Rate and Rhythm: Regular rhythm.      Heart sounds: No murmur heard.     No gallop.   Pulmonary:      Effort: Pulmonary effort is normal. No respiratory distress.      Breath sounds: Normal breath sounds. No wheezing, rhonchi " or rales.   Abdominal:      General: Abdomen is flat. Bowel sounds are normal. There is no distension.      Palpations: Abdomen is soft.      Tenderness: There is abdominal tenderness (periumbilical primarily). There is no guarding or rebound.   Musculoskeletal:         General: Normal range of motion.   Skin:     General: Skin is warm and dry.   Neurological:      General: No focal deficit present.      Mental Status: She is alert and oriented to person, place, and time.      Cranial Nerves: No cranial nerve deficit.      Sensory: No sensory deficit.      Motor: No weakness.   Psychiatric:         Mood and Affect: Mood normal.         Behavior: Behavior normal.         Inpatient Medications:  ciprofloxacin, 400 mg, intravenous, q12h  enoxaparin, 40 mg, subcutaneous, q24h  FLUoxetine, 40 mg, oral, Daily  metroNIDAZOLE, 500 mg, intravenous, q8h      PRN medications: acetaminophen **OR** acetaminophen **OR** acetaminophen, morphine, morphine, ondansetron **OR** ondansetron    Outpatient Medications:  Prior to Admission medications    Medication Sig Start Date End Date Taking? Authorizing Provider   clindamycin (Cleocin) 150 mg capsule Take 1 capsule (150 mg) by mouth once daily. 7/31/23   Historical Provider, MD   FLUoxetine (PROzac) 40 mg capsule Take 1 capsule (40 mg) by mouth once daily. 8/11/23   Henyr Wallace MD   ibuprofen (Motrin) capsule Take 1 capsule (200 mg) by mouth 2 times a day.    Historical Provider, MD   multivitamin tablet Take 1 tablet by mouth once daily.    Historical Provider, MD   omeprazole (PriLOSEC) 40 mg DR capsule Take 1 capsule (40 mg) by mouth once daily in the morning. Take before meals. 4/7/23 8/11/23  Henry Wallace MD   ondansetron (Zofran) 4 mg tablet Take 1 tablet (4 mg) by mouth every 6 hours. 8/3/21   Historical Provider, MD   pimecrolimus (Elidel) 1 % cream Apply 1 Application topically twice a day. 10/20/21   Historical Provider, MD       LABS AND IMAGING:     Last  Labs:  CBC - 12/3/2023:  8:03 AM  18.9 13.8 327    41.4      CMP - 12/3/2023:  8:03 AM  8.9 6.5 13 --- 0.8   _ 4.2 20 72      PTT - No results in last year.  _   _ _     Troponin I, High Sensitivity   Date/Time Value Ref Range Status   12/03/2023 08:03 AM <3 0 - 13 ng/L Final     VLDL   Date/Time Value Ref Range Status   04/07/2023 12:08 PM 21 0 - 40 mg/dL Final   04/08/2022 11:06 AM 26 0 - 40 mg/dL Final   04/20/2021 09:08 AM 23 0 - 40 mg/dL Final

## 2023-12-04 LAB — GLUCOSE BLD MANUAL STRIP-MCNC: 115 MG/DL (ref 74–99)

## 2023-12-04 PROCEDURE — 96375 TX/PRO/DX INJ NEW DRUG ADDON: CPT

## 2023-12-04 PROCEDURE — 2500000004 HC RX 250 GENERAL PHARMACY W/ HCPCS (ALT 636 FOR OP/ED): Performed by: INTERNAL MEDICINE

## 2023-12-04 PROCEDURE — 82947 ASSAY GLUCOSE BLOOD QUANT: CPT

## 2023-12-04 PROCEDURE — 96366 THER/PROPH/DIAG IV INF ADDON: CPT

## 2023-12-04 PROCEDURE — G0378 HOSPITAL OBSERVATION PER HR: HCPCS

## 2023-12-04 PROCEDURE — 2500000001 HC RX 250 WO HCPCS SELF ADMINISTERED DRUGS (ALT 637 FOR MEDICARE OP): Performed by: NURSE PRACTITIONER

## 2023-12-04 PROCEDURE — 99232 SBSQ HOSP IP/OBS MODERATE 35: CPT | Performed by: INTERNAL MEDICINE

## 2023-12-04 PROCEDURE — 96372 THER/PROPH/DIAG INJ SC/IM: CPT | Performed by: INTERNAL MEDICINE

## 2023-12-04 PROCEDURE — 96376 TX/PRO/DX INJ SAME DRUG ADON: CPT

## 2023-12-04 PROCEDURE — 99223 1ST HOSP IP/OBS HIGH 75: CPT | Performed by: NURSE PRACTITIONER

## 2023-12-04 RX ORDER — POLYETHYLENE GLYCOL 3350 17 G/17G
238 POWDER, FOR SOLUTION ORAL ONCE AS NEEDED
Status: COMPLETED | OUTPATIENT
Start: 2023-12-04 | End: 2023-12-05

## 2023-12-04 RX ORDER — POLYETHYLENE GLYCOL 3350, SODIUM CHLORIDE, SODIUM BICARBONATE, POTASSIUM CHLORIDE 420; 11.2; 5.72; 1.48 G/4L; G/4L; G/4L; G/4L
2000 POWDER, FOR SOLUTION ORAL 2 TIMES DAILY
Status: COMPLETED | OUTPATIENT
Start: 2023-12-04 | End: 2023-12-05

## 2023-12-04 RX ADMIN — CIPROFLOXACIN 400 MG: 400 INJECTION, SOLUTION INTRAVENOUS at 09:01

## 2023-12-04 RX ADMIN — METRONIDAZOLE 500 MG: 500 INJECTION, SOLUTION INTRAVENOUS at 19:42

## 2023-12-04 RX ADMIN — HYDROMORPHONE HYDROCHLORIDE 0.4 MG: 1 INJECTION, SOLUTION INTRAMUSCULAR; INTRAVENOUS; SUBCUTANEOUS at 02:40

## 2023-12-04 RX ADMIN — METRONIDAZOLE 500 MG: 500 INJECTION, SOLUTION INTRAVENOUS at 11:01

## 2023-12-04 RX ADMIN — ENOXAPARIN SODIUM 40 MG: 40 INJECTION SUBCUTANEOUS at 13:34

## 2023-12-04 RX ADMIN — POLYETHYLENE GLYCOL 3350, SODIUM CHLORIDE, SODIUM BICARBONATE AND POTASSIUM CHLORIDE WITH LEMON FLAVOR 2000 ML: 420; 11.2; 5.72; 1.48 POWDER, FOR SOLUTION ORAL at 17:49

## 2023-12-04 RX ADMIN — METRONIDAZOLE 500 MG: 500 INJECTION, SOLUTION INTRAVENOUS at 02:45

## 2023-12-04 RX ADMIN — ONDANSETRON 4 MG: 2 INJECTION INTRAMUSCULAR; INTRAVENOUS at 22:24

## 2023-12-04 RX ADMIN — HYDROMORPHONE HYDROCHLORIDE 0.2 MG: 0.2 INJECTION, SOLUTION INTRAMUSCULAR; INTRAVENOUS; SUBCUTANEOUS at 14:57

## 2023-12-04 RX ADMIN — HYDROMORPHONE HYDROCHLORIDE 0.2 MG: 0.2 INJECTION, SOLUTION INTRAMUSCULAR; INTRAVENOUS; SUBCUTANEOUS at 19:41

## 2023-12-04 RX ADMIN — SODIUM CHLORIDE, POTASSIUM CHLORIDE, SODIUM LACTATE AND CALCIUM CHLORIDE 60 ML/HR: 600; 310; 30; 20 INJECTION, SOLUTION INTRAVENOUS at 09:01

## 2023-12-04 RX ADMIN — ONDANSETRON 4 MG: 2 INJECTION INTRAMUSCULAR; INTRAVENOUS at 14:43

## 2023-12-04 RX ADMIN — CIPROFLOXACIN 400 MG: 400 INJECTION, SOLUTION INTRAVENOUS at 21:29

## 2023-12-04 ASSESSMENT — PAIN - FUNCTIONAL ASSESSMENT
PAIN_FUNCTIONAL_ASSESSMENT: 0-10

## 2023-12-04 ASSESSMENT — PAIN DESCRIPTION - LOCATION
LOCATION: ABDOMEN
LOCATION: ABDOMEN

## 2023-12-04 ASSESSMENT — PAIN SCALES - GENERAL
PAINLEVEL_OUTOF10: 6
PAINLEVEL_OUTOF10: 3
PAINLEVEL_OUTOF10: 7

## 2023-12-04 ASSESSMENT — PAIN DESCRIPTION - ORIENTATION: ORIENTATION: LOWER

## 2023-12-04 NOTE — CONSULTS
"Department of Internal Medicine  Gastroenterology  Consult note      Reason for Consult: abd pain, suspected crohn's flare, not currently on therapy    Chief Complaint: abdominal pain and diarrhea      History of Present Illness      The patient is a 43 y.o. female with signficant past medical history of ?Crohn's disease (diagnosed about age 25 per patient, currently following with Dr. Rodriguez), laparoscopic ileocecectomy in 2008 at [facility] (Dr. Carpenter) for a presumed Crohn's stricture.  However, pathology revealed stage IV endometriosis. Reportedly the patient had endometrial tissue on her ileum, cecum and kidney. History of pSBO 2/2 stricture at anastomosis.  S/p balloon dilation of her ICA stricture 4/2021.  History of Endometriosis, depression and anxiety  who presents for  abdominal pain that began about 1 PM on 12/2/23.    Patient presents this admission with abdominal pain that started about 1 hour after eating [employer] described as sharp and shooting.  Pain began on the right periumbilical area but has since spread to the left lower side of her abdomen as well associated with N/V. She feels that the pain is similar to prior ?Crohn's flare ups.  Patient reports last being hospitalized for abd pain presumably Crohn's flareup in approximately 2020.      Patient reports she \"always has diarrhea\" that worsened since her surgery in 2008.  Patient reports now normally having up to 5 semiliquid bowel movements a day.  Questran trialed, but she could not tolerate this. Patient reports no change in bowel habits, no increase in frequency.  Patient denies hematemesis, melena or hematochezia.  Patient reports she was on Pentasa, steroids and some kind of ?immunosuppressant by mouth in the past.  However, she has been off any medications for Crohn's for the past several years because she had been doing so well.  Patient refused Remicade infusions in the past due to side effects reportedly per patient.  Denies any fevers or " "chills.  No chest pain or shortness of breath.  No dysphagia.  Patient takes ibuprofen as needed, but not on a regular basis.  No blood thinners at home.    Patient reports she recently had to have all of her teeth pulled and now has dentures due to a \"gum infection.\"  Patient reports difficulty eating with a 5 pound weight loss prior to having teeth pulled.  Patient does report being on antibiotics in September x2 days prior to dental work.    Patient has overall clinically improved today.  Nausea improved only occurring intermittently now.  No further vomiting since yesterday, 12/3.  Abdominal pain improved described as more soreness now as opposed to sharp/shooting pain prior.  Patient reports she has not had a bowel movement since 12/2 about noon, but +flatus.      Allergies  Cefaclor    Current Medication    Current Facility-Administered Medications:     acetaminophen (Tylenol) tablet 650 mg, 650 mg, oral, q4h PRN **OR** acetaminophen (Tylenol) oral liquid 650 mg, 650 mg, oral, q4h PRN **OR** acetaminophen (Tylenol) suppository 650 mg, 650 mg, rectal, q4h PRN, David Graves MD    ciprofloxacin (Cipro) IVPB 400 mg, 400 mg, intravenous, q12h, David Graves MD, Stopped at 12/03/23 2206    enoxaparin (Lovenox) syringe 40 mg, 40 mg, subcutaneous, q24h, David Graves MD, 40 mg at 12/03/23 1429    FLUoxetine (PROzac) capsule 40 mg, 40 mg, oral, Daily, David Graves MD, 40 mg at 12/03/23 1423    lactated Ringer's infusion, 60 mL/hr, intravenous, Continuous, David Graves MD, Last Rate: 60 mL/hr at 12/03/23 1631, 60 mL/hr at 12/03/23 1631    metroNIDAZOLE in NaCl (iso-os) (Flagyl)  mg, 500 mg, intravenous, q8h, David Graves MD, Stopped at 12/04/23 0345    morphine injection 2 mg, 2 mg, intravenous, q4h PRN, David Graves MD    morphine injection 4 mg, 4 mg, intravenous, q4h PRN, David Graves MD, 4 mg at 12/03/23 1835    ondansetron (Zofran) tablet 4 mg, 4 mg, oral, q8h PRN **OR** " ondansetron (Zofran) injection 4 mg, 4 mg, intravenous, q8h PRN, David Graves MD, 4 mg at 12/03/23 1625    Past Medical History  Active Ambulatory Problems     Diagnosis Date Noted    Abnormal uterine bleeding (AUB) 02/23/2023    Anastomotic stricture of small intestine 02/23/2023    Atopic dermatitis 02/23/2023    Benign paroxysmal positional vertigo 02/23/2023    Chronic diarrhea 02/23/2023    Chronic fatigue 02/23/2023    Crohn's disease of small intestine without complication (CMS/HCC) 02/23/2023    Epigastric pain 02/23/2023    Gastroesophageal reflux disease with esophagitis 02/23/2023    Generalized anxiety disorder 02/23/2023    Headache 02/23/2023    Hyperglycemia 02/23/2023    Leukocytosis 02/23/2023    Moderate episode of recurrent major depressive disorder (CMS/HCC) 02/23/2023    Onychomycosis of toenail 02/23/2023    Right-sided temporomandibular joint pain-dysfunction syndrome 02/23/2023    Healthcare maintenance 04/07/2023    Preoperative clearance 08/11/2023    Dental caries 08/11/2023    Malocclusion 08/11/2023     Resolved Ambulatory Problems     Diagnosis Date Noted    No Resolved Ambulatory Problems     Past Medical History:   Diagnosis Date    Personal history of diseases of the blood and blood-forming organs and certain disorders involving the immune mechanism     Personal history of other diseases of the digestive system     Personal history of other diseases of the female genital tract        Past Surgical History  Past Surgical History:   Procedure Laterality Date    OTHER SURGICAL HISTORY  09/02/2020    Ablation    OTHER SURGICAL HISTORY  09/02/2020    Dilation and curettage    OTHER SURGICAL HISTORY  10/07/2020    Colectomy    OTHER SURGICAL HISTORY  04/14/2021    Endoscopy    OTHER SURGICAL HISTORY  04/14/2021    Colonoscopy       Family History  No family history on file.  No family history of stomach or colon cancer    Social History  TOBACCO:  reports that she has been smoking  "cigarettes. She has a 10.00 pack-year smoking history. She has never used smokeless tobacco.  ETOH:  reports that she does not currently use alcohol.  DRUGS:  reports no history of drug use.  MARITAL STATUS:   OCCUPATION:    Review of Systems  All 10 systems reviewed with the patient/family and negative except for what is mentioned in HPI      PHYSICAL EXAM  VS: BP 99/51   Pulse 66   Temp 36.7 °C (98.1 °F)   Resp 16   Ht 1.575 m (5' 2.01\")   Wt 59 kg (130 lb)   LMP 11/12/2023 (Approximate)   SpO2 96%   BMI 23.77 kg/m²  Body mass index is 23.77 kg/m².  Physical Exam  Vitals reviewed.   Constitutional:       General: She is not in acute distress.     Appearance: Normal appearance.   HENT:      Head: Normocephalic.      Nose: Nose normal.      Mouth/Throat:      Mouth: Mucous membranes are moist.      Pharynx: Oropharynx is clear.   Eyes:      Extraocular Movements: Extraocular movements intact.      Conjunctiva/sclera: Conjunctivae normal.      Pupils: Pupils are equal, round, and reactive to light.   Cardiovascular:      Rate and Rhythm: Normal rate and regular rhythm.      Pulses: Normal pulses.      Heart sounds: Normal heart sounds.   Pulmonary:      Effort: Pulmonary effort is normal.      Breath sounds: Normal breath sounds.   Abdominal:      General: There is no distension.      Palpations: Abdomen is soft.      Tenderness: There is abdominal tenderness. There is no guarding or rebound.      Comments: Hypoactive bowel sounds.  Periumbilical and bilateral lower quadrant tenderness to palpation   Musculoskeletal:         General: Normal range of motion.      Cervical back: Normal range of motion.   Skin:     General: Skin is warm and dry.   Neurological:      General: No focal deficit present.      Mental Status: She is alert and oriented to person, place, and time.   Psychiatric:         Mood and Affect: Mood normal.         Behavior: Behavior normal.          DATA  Recent blood work and relevant " radiology and endoscopic studies were reviewed and discussed with the patient   Results from last 7 days   Lab Units 12/03/23  0803   WBC AUTO x10*3/uL 18.9*   RBC AUTO x10*6/uL 4.66   HEMOGLOBIN g/dL 13.8   HEMATOCRIT % 41.4   MCV fL 89   MCHC g/dL 33.3   RDW % 12.9   PLATELETS AUTO x10*3/uL 327       Results from last 72 hours   Lab Units 12/03/23  0803   SODIUM mmol/L 137   POTASSIUM mmol/L 3.5   CHLORIDE mmol/L 105   CO2 mmol/L 25   BUN mg/dL 5*   CREATININE mg/dL 0.62   CALCIUM mg/dL 8.9   PROTEIN TOTAL g/dL 6.5   BILIRUBIN TOTAL mg/dL 0.8   ALK PHOS U/L 72   AST U/L 13   ALT U/L 20             Results from last 72 hours   Lab Units 12/03/23  0803   LIPASE U/L 22       RADIOLOGY REVIEW  CT a/p 12/3/23:  IMPRESSION:  1. Mild to moderate wall thickening of approximately 7 cm of distal  small bowel extending to the ileocolonic anastomosis with more  proximal fluid-filled mildly dilated loops of small bowel measuring up  to approximately 3 cm. Overall appearance is similar compared to  8/14/2020. Findings compatible with an enteritis with associated ileus  and compatible with mild active Crohn's.  2. Small amount of pelvic free fluid.  3. Mild to moderate hepatic steatosis.  4. 2.2 cm left adnexal cyst.    ENDOSCOPIC REVIEW    Colonoscopy 4/14/21 by Dr. Rodriguez for dilation of ICA stricture  Findings:       There was evidence of a prior end-to-side ileo-colonic anastomosis in        the ascending colon. This was non-patent and was characterized by severe        stenosis and ulceration. The anastomosis was not traversed. A TTS        dilator was passed through the scope. Dilation with a 10-11-12 mm and a        12-13.5-15 mm colonic balloon dilator was performed.       The exam was otherwise without abnormality.      EGD 4/14/21 by Dr. Rodriguez for epi abd pain:  Findings:       LA Grade B (one or more mucosal breaks greater than 5 mm, not extending        between the tops of two mucosal folds) esophagitis with no  bleeding was        found.       Patchy mild inflammation characterized by erythema was found in the        gastric antrum. Biopsies were taken with a cold forceps for Helicobacter        pylori testing.       The examined duodenum was normal.      Colonoscopy 1/20/21 with Dr. Rodriguez for rectal bleeding and diarrhea:  Findings:       There was evidence of a prior end-to-side ileo-colonic anastomosis in        the ascending colon. This was patent and was characterized by severe        stenosis and ulceration. The anastomosis was not traversed. Biopsies        were taken with a cold forceps for histology.       The exam was otherwise without abnormality on direct and retroflexion        views.       Biopsies for histology were taken with a cold forceps from the ascending        colon, descending colon and rectum for evaluation of microscopic colitis.       The perianal and digital rectal examinations were normal.        IMPRESSION/RECOMMENDATIONS    The patient is a 43 y.o. female with signficant past medical history of ?Crohn's disease (diagnosed about age 25 per patient, currently following with Dr. Rodriguez), laparoscopic ileocecectomy in 2008 at [facility] (Dr. Carpenter) for a presumed Crohn's stricture.  However, pathology revealed stage IV endometriosis. Reportedly the patient had endometrial tissue on her ileum, cecum and kidney.  History of pSBO 2/2 stricture at anastomosis.  S/p balloon dilation of her ICA stricture 4/2021.  History of Endometriosis, depression and anxiety  who presents for  abdominal pain that began about 1 PM on 12/2/23.          Assessment  #Abdominal pain with N/V -consider recurrent pSBO 2/2 stricture at anastomosis. s/p balloon dilation of ICA stricture 4/2021- no overt GI bleeding.  No anemia. Nausea improved, no further vomiting since 12/3.  Abdominal pain improved  #Chronic diarrhea - Normally has up to 5 stools a day, no increased frequency.  On antibiotics within the past 3 months due to  dental procedure.  .+ Flatus, but no bowel movement since 12/2  #Abnormal CT results-findings suggest enteritis and associated ileus as well as possible mild active Crohn's.  However, unlikely patient actually has crohn's exacerbation since no increased frequency of stool, non-bloody stools and patient has seemed to overall clinically improve today without steroids. CRP and sed rate both normal.   #S/p ileocecectomy 2008 for endometriosis  #Leukocytosis        Plan  -Colonoscopy tomorrow for potential repeat dilation with history of stricture  -bowel prep today  Clear liquid diet today and npo after midnight  -Agree with stool pathogen panel and also consider checking for C. Difficile if diarrhea persists -Continue supportive care per primary team                  Plan discussed with Dr. Rodriguez.  GI will continue to follow  Total of 70 minutes spent on visit and overall professional care of the patient  (Electronically signed byMALLORY Elder on 12/4/2023 at 7:04 AM)       Inpatient consult to gastroenterology  Consult performed by: MALLORY Elder  Consult ordered by: David Graves MD

## 2023-12-04 NOTE — PROGRESS NOTES
Edilma Benitez is a 43 y.o. female on day 0 of admission presenting with Crohn's disease of ileum, unspecified complication (CMS/HCC).      Subjective   No overnight events. Abd pain improved today. No diarrhea or BM since admission. No fever. Feeling hungry would like to advance diet.        Objective     Last Recorded Vitals  BP 97/53   Pulse 63   Temp 36.8 °C (98.2 °F) (Temporal)   Resp 16   Wt 59 kg (130 lb)   SpO2 94%   Intake/Output last 3 Shifts:    Intake/Output Summary (Last 24 hours) at 12/4/2023 1307  Last data filed at 12/4/2023 0309  Gross per 24 hour   Intake 875 ml   Output --   Net 875 ml       Admission Weight  Weight: 59 kg (130 lb) (12/03/23 0649)    Daily Weight  12/03/23 : 59 kg (130 lb)    Image Results  CT abdomen pelvis wo IV contrast  Narrative: STUDY:  CT Abdomen and Pelvis without IV Contrast; 12/3/2023 8:04 AM.  INDICATION:  Abdominal pain.  COMPARISON:  US gallbladder 2/8/2021.  CT abdomen pelvis 8/14/2020.  ACCESSION NUMBER(S):  [id number]  ORDERING CLINICIAN:  BRYCE KINSEY  TECHNIQUE:  CT of the abdomen and pelvis was performed.  Contiguous axial images  were obtained at 3 mm slice thickness through the abdomen and pelvis.   Coronal and sagittal reconstructions at 3 mm slice thickness were  performed. No intravenous contrast was administered.  Oral contrast  was administered as a component of this study; the patient received  approximately QUANTITY mL of TYPE OF CONTRAST by mouth.  Automated mA/kV exposure control was utilized and patient examination  was performed in strict accordance with principles of ALARA.  FINDINGS:  Please note that the evaluation of vessels, lymph nodes and organs is  limited without intravenous contrast.      LOWER CHEST:  No cardiomegaly.  No pericardial effusion.  Lung bases are clear.     ABDOMEN:     LIVER:  No hepatomegaly.  Smooth surface contour.  Mild to moderate fatty  infiltration of the liver.     BILE DUCTS:  No intrahepatic or  extrahepatic biliary ductal dilatation.     GALLBLADDER:  The gallbladder is present without gallstones.  STOMACH:  No abnormalities identified.     PANCREAS:  No masses or ductal dilatation.     SPLEEN:  No splenomegaly or focal splenic lesion.     ADRENAL GLANDS:  No thickening or nodules.     KIDNEYS AND URETERS:  Kidneys are normal in size and location.  No renal or ureteral  calculi.     PELVIS:     BLADDER:  No abnormalities identified.     REPRODUCTIVE ORGANS:  2.2 cm left adnexal cyst     BOWEL:  Right lower quadrant ileocolonic anastomosis noted. Mild to moderate  wall thickening of approximately 7 cm of distal small bowel extending  to the ileocolonic anastomosis with more proximal fluid-filled mildly  dilated loops of small bowel measuring up to approximately 3 cm.  Overall appearance is similar compared to 8/14/2020. Mild fibrofatty  proliferative change throughout the wall of the colon.     VESSELS:  No abnormalities identified.  Abdominal aorta is normal in caliber.      PERITONEUM/RETROPERITONEUM/LYMPH NODES:  Small amount of pelvic free fluid  No pneumoperitoneum.  No lymphadenopathy.     ABDOMINAL WALL:  No abnormalities identified.  SOFT TISSUES:   No abnormalities identified.     BONES:  No acute fracture or aggressive osseous lesion.  Impression: 1. Mild to moderate wall thickening of approximately 7 cm of distal  small bowel extending to the ileocolonic anastomosis with more  proximal fluid-filled mildly dilated loops of small bowel measuring up  to approximately 3 cm. Overall appearance is similar compared to  8/14/2020. Findings compatible with an enteritis with associated ileus  and compatible with mild active Crohn's.  2. Small amount of pelvic free fluid.  3. Mild to moderate hepatic steatosis.  4. 2.2 cm left adnexal cyst.  Signed by Blanco Cervantes MD      Vitals reviewed.   Constitutional:       Appearance: Normal appearance.   HENT:      Head: Normocephalic and atraumatic.      Nose:  Nose normal.      Mouth/Throat:      Mouth: Mucous membranes are moist.      Pharynx: Oropharynx is clear.   Eyes:      Extraocular Movements: Extraocular movements intact.      Conjunctiva/sclera: Conjunctivae normal.      Pupils: Pupils are equal, round, and reactive to light.   Cardiovascular:      Rate and Rhythm: Regular rhythm.      Heart sounds: No murmur heard.     No gallop.   Pulmonary:      Effort: Pulmonary effort is normal. No respiratory distress.      Breath sounds: Normal breath sounds. No wheezing, rhonchi or rales.   Abdominal:      General: Abdomen is flat. Bowel sounds are normal. There is no distension.      Palpations: Abdomen is soft.      Tenderness: There is abdominal tenderness (periumbilical primarily). There is no guarding or rebound.   Musculoskeletal:         General: Normal range of motion.   Skin:     General: Skin is warm and dry.   Neurological:      General: No focal deficit present.      Mental Status: She is alert and oriented to person, place, and time.      Cranial Nerves: No cranial nerve deficit.      Sensory: No sensory deficit.      Motor: No weakness.   Psychiatric:         Mood and Affect: Mood normal.         Behavior: Behavior normal.     Relevant Results               Assessment/Plan        Principal Problem:    Crohn's disease of ileum, unspecified complication (CMS/HCC)     Abdominal pain  Crohn's diease  Ileocolonic anastomosis stricture  -diagnosed ~20 years ago  -has been off medication past few years as she had been doing so well  -follows with Dr. Rodriguez  -has known ileocolonic anastomosis narrowing that has had to be dilated in the past  -CT here showing mild to moderate wall thickening of approximately 7 cm of distal small bowel extending to the ileocolonic anastomosis with more proximal fluid-filled mildly dilated loops of small bowel  -will check stool studies to r/o infectious etiology prior to starting any steroids  -GI consult; given small bowel dilation  may need repeat dilation  -check inflammatory markers  -NPO  -IVF  -pain control     Endometriosis  Depression/anxiety  -resume home medications     VTE Prophylaxis: lovenox    12/4/23:  Improved symptoms today  Inflammatory markers are negative  Cont NPO until GI evaluation; possibly advance later today depending on their plans  IVF  Pain control  No BM since admission, unlikely infectious; will cont abx for now pending GI leoncio Graves MD

## 2023-12-04 NOTE — PROGRESS NOTES
12/04/23 1034   Discharge Planning   Living Arrangements Spouse/significant other   Support Systems Spouse/significant other   Assistance Needed none PTA   Type of Residence Private residence   Number of Stairs to Enter Residence 2   Number of Stairs Within Residence 0   Do you have animals or pets at home? Yes   Home or Post Acute Services None   Patient expects to be discharged to: HOME   Does the patient need discharge transport arranged? No     Pending GI consult    Prefers HOME declining any needs.

## 2023-12-05 ENCOUNTER — ANESTHESIA EVENT (OUTPATIENT)
Dept: GASTROENTEROLOGY | Facility: HOSPITAL | Age: 43
End: 2023-12-05
Payer: COMMERCIAL

## 2023-12-05 ENCOUNTER — APPOINTMENT (OUTPATIENT)
Dept: GASTROENTEROLOGY | Facility: HOSPITAL | Age: 43
End: 2023-12-05
Payer: COMMERCIAL

## 2023-12-05 ENCOUNTER — ANESTHESIA (OUTPATIENT)
Dept: GASTROENTEROLOGY | Facility: HOSPITAL | Age: 43
End: 2023-12-05
Payer: COMMERCIAL

## 2023-12-05 LAB
ANION GAP SERPL CALC-SCNC: 11 MMOL/L (ref 10–20)
B-HCG SERPL-ACNC: <2 MIU/ML
BASOPHILS # BLD AUTO: 0.03 X10*3/UL (ref 0–0.1)
BASOPHILS NFR BLD AUTO: 0.4 %
BUN SERPL-MCNC: 4 MG/DL (ref 6–23)
CALCIUM SERPL-MCNC: 8.7 MG/DL (ref 8.6–10.3)
CHLORIDE SERPL-SCNC: 106 MMOL/L (ref 98–107)
CO2 SERPL-SCNC: 26 MMOL/L (ref 21–32)
CREAT SERPL-MCNC: 0.69 MG/DL (ref 0.5–1.05)
EOSINOPHIL # BLD AUTO: 0.21 X10*3/UL (ref 0–0.7)
EOSINOPHIL NFR BLD AUTO: 2.5 %
ERYTHROCYTE [DISTWIDTH] IN BLOOD BY AUTOMATED COUNT: 12.7 % (ref 11.5–14.5)
GFR SERPL CREATININE-BSD FRML MDRD: >90 ML/MIN/1.73M*2
GLUCOSE SERPL-MCNC: 92 MG/DL (ref 74–99)
HCT VFR BLD AUTO: 36.8 % (ref 36–46)
HGB BLD-MCNC: 12.1 G/DL (ref 12–16)
HOLD SPECIMEN: NORMAL
IMM GRANULOCYTES # BLD AUTO: 0.04 X10*3/UL (ref 0–0.7)
IMM GRANULOCYTES NFR BLD AUTO: 0.5 % (ref 0–0.9)
INR PPP: 1.2 (ref 0.9–1.1)
LYMPHOCYTES # BLD AUTO: 1.42 X10*3/UL (ref 1.2–4.8)
LYMPHOCYTES NFR BLD AUTO: 16.7 %
MAGNESIUM SERPL-MCNC: 1.81 MG/DL (ref 1.6–2.4)
MCH RBC QN AUTO: 29.6 PG (ref 26–34)
MCHC RBC AUTO-ENTMCNC: 32.9 G/DL (ref 32–36)
MCV RBC AUTO: 90 FL (ref 80–100)
MONOCYTES # BLD AUTO: 0.63 X10*3/UL (ref 0.1–1)
MONOCYTES NFR BLD AUTO: 7.4 %
NEUTROPHILS # BLD AUTO: 6.15 X10*3/UL (ref 1.2–7.7)
NEUTROPHILS NFR BLD AUTO: 72.5 %
NRBC BLD-RTO: 0 /100 WBCS (ref 0–0)
PLATELET # BLD AUTO: 265 X10*3/UL (ref 150–450)
POTASSIUM SERPL-SCNC: 3.2 MMOL/L (ref 3.5–5.3)
PROTHROMBIN TIME: 13.2 SECONDS (ref 9.8–12.8)
RBC # BLD AUTO: 4.09 X10*6/UL (ref 4–5.2)
SODIUM SERPL-SCNC: 140 MMOL/L (ref 136–145)
WBC # BLD AUTO: 8.5 X10*3/UL (ref 4.4–11.3)

## 2023-12-05 PROCEDURE — 96376 TX/PRO/DX INJ SAME DRUG ADON: CPT | Mod: 59

## 2023-12-05 PROCEDURE — 45386 COLONOSCOPY W/BALLOON DILAT: CPT | Performed by: INTERNAL MEDICINE

## 2023-12-05 PROCEDURE — 88305 TISSUE EXAM BY PATHOLOGIST: CPT | Mod: TC,SUR,ELYLAB | Performed by: INTERNAL MEDICINE

## 2023-12-05 PROCEDURE — 84702 CHORIONIC GONADOTROPIN TEST: CPT | Performed by: NURSE PRACTITIONER

## 2023-12-05 PROCEDURE — 2500000004 HC RX 250 GENERAL PHARMACY W/ HCPCS (ALT 636 FOR OP/ED): Performed by: INTERNAL MEDICINE

## 2023-12-05 PROCEDURE — 2500000004 HC RX 250 GENERAL PHARMACY W/ HCPCS (ALT 636 FOR OP/ED): Performed by: NURSE PRACTITIONER

## 2023-12-05 PROCEDURE — 3700000001 HC GENERAL ANESTHESIA TIME - INITIAL BASE CHARGE

## 2023-12-05 PROCEDURE — 2500000004 HC RX 250 GENERAL PHARMACY W/ HCPCS (ALT 636 FOR OP/ED): Performed by: FAMILY MEDICINE

## 2023-12-05 PROCEDURE — 36415 COLL VENOUS BLD VENIPUNCTURE: CPT | Performed by: INTERNAL MEDICINE

## 2023-12-05 PROCEDURE — C1726 CATH, BAL DIL, NON-VASCULAR: HCPCS

## 2023-12-05 PROCEDURE — 83735 ASSAY OF MAGNESIUM: CPT | Performed by: INTERNAL MEDICINE

## 2023-12-05 PROCEDURE — 2500000001 HC RX 250 WO HCPCS SELF ADMINISTERED DRUGS (ALT 637 FOR MEDICARE OP): Performed by: NURSE PRACTITIONER

## 2023-12-05 PROCEDURE — 36415 COLL VENOUS BLD VENIPUNCTURE: CPT | Performed by: NURSE PRACTITIONER

## 2023-12-05 PROCEDURE — 99232 SBSQ HOSP IP/OBS MODERATE 35: CPT | Performed by: HOSPITALIST

## 2023-12-05 PROCEDURE — 85610 PROTHROMBIN TIME: CPT | Performed by: NURSE PRACTITIONER

## 2023-12-05 PROCEDURE — 45380 COLONOSCOPY AND BIOPSY: CPT | Performed by: INTERNAL MEDICINE

## 2023-12-05 PROCEDURE — 3700000002 HC GENERAL ANESTHESIA TIME - EACH INCREMENTAL 1 MINUTE

## 2023-12-05 PROCEDURE — 96367 TX/PROPH/DG ADDL SEQ IV INF: CPT | Mod: 59

## 2023-12-05 PROCEDURE — 2720000007 HC OR 272 NO HCPCS

## 2023-12-05 PROCEDURE — 2500000001 HC RX 250 WO HCPCS SELF ADMINISTERED DRUGS (ALT 637 FOR MEDICARE OP): Performed by: INTERNAL MEDICINE

## 2023-12-05 PROCEDURE — 85025 COMPLETE CBC W/AUTO DIFF WBC: CPT | Performed by: INTERNAL MEDICINE

## 2023-12-05 PROCEDURE — 96372 THER/PROPH/DIAG INJ SC/IM: CPT | Mod: 59 | Performed by: INTERNAL MEDICINE

## 2023-12-05 PROCEDURE — 80048 BASIC METABOLIC PNL TOTAL CA: CPT | Performed by: INTERNAL MEDICINE

## 2023-12-05 PROCEDURE — 99231 SBSQ HOSP IP/OBS SF/LOW 25: CPT | Performed by: NURSE PRACTITIONER

## 2023-12-05 PROCEDURE — 2500000004 HC RX 250 GENERAL PHARMACY W/ HCPCS (ALT 636 FOR OP/ED): Performed by: NURSE ANESTHETIST, CERTIFIED REGISTERED

## 2023-12-05 PROCEDURE — 88305 TISSUE EXAM BY PATHOLOGIST: CPT | Performed by: PATHOLOGY

## 2023-12-05 PROCEDURE — 96366 THER/PROPH/DIAG IV INF ADDON: CPT | Mod: 59

## 2023-12-05 PROCEDURE — G0378 HOSPITAL OBSERVATION PER HR: HCPCS

## 2023-12-05 RX ORDER — PROPOFOL 10 MG/ML
INJECTION, EMULSION INTRAVENOUS AS NEEDED
Status: DISCONTINUED | OUTPATIENT
Start: 2023-12-05 | End: 2023-12-05

## 2023-12-05 RX ORDER — POTASSIUM CHLORIDE 14.9 MG/ML
20 INJECTION INTRAVENOUS
Status: COMPLETED | OUTPATIENT
Start: 2023-12-05 | End: 2023-12-05

## 2023-12-05 RX ORDER — SODIUM CHLORIDE, SODIUM LACTATE, POTASSIUM CHLORIDE, CALCIUM CHLORIDE 600; 310; 30; 20 MG/100ML; MG/100ML; MG/100ML; MG/100ML
100 INJECTION, SOLUTION INTRAVENOUS CONTINUOUS
Status: CANCELLED | OUTPATIENT
Start: 2023-12-05

## 2023-12-05 RX ORDER — ONDANSETRON HYDROCHLORIDE 2 MG/ML
4 INJECTION, SOLUTION INTRAVENOUS ONCE
Status: COMPLETED | OUTPATIENT
Start: 2023-12-05 | End: 2023-12-05

## 2023-12-05 RX ADMIN — POLYETHYLENE GLYCOL 3350, SODIUM CHLORIDE, SODIUM BICARBONATE AND POTASSIUM CHLORIDE WITH LEMON FLAVOR 2000 ML: 420; 11.2; 5.72; 1.48 POWDER, FOR SOLUTION ORAL at 00:02

## 2023-12-05 RX ADMIN — FLUOXETINE HYDROCHLORIDE 40 MG: 20 CAPSULE ORAL at 16:08

## 2023-12-05 RX ADMIN — PROPOFOL 100 MG: 10 INJECTION, EMULSION INTRAVENOUS at 13:47

## 2023-12-05 RX ADMIN — METRONIDAZOLE 500 MG: 500 INJECTION, SOLUTION INTRAVENOUS at 02:47

## 2023-12-05 RX ADMIN — POTASSIUM CHLORIDE 20 MEQ: 14.9 INJECTION, SOLUTION INTRAVENOUS at 10:00

## 2023-12-05 RX ADMIN — ACETAMINOPHEN 650 MG: 325 TABLET ORAL at 16:17

## 2023-12-05 RX ADMIN — PROPOFOL 200 MG: 10 INJECTION, EMULSION INTRAVENOUS at 13:44

## 2023-12-05 RX ADMIN — CIPROFLOXACIN 400 MG: 400 INJECTION, SOLUTION INTRAVENOUS at 16:09

## 2023-12-05 RX ADMIN — POLYETHYLENE GLYCOL 3350 238 G: 17 POWDER, FOR SOLUTION ORAL at 02:15

## 2023-12-05 RX ADMIN — POTASSIUM CHLORIDE 20 MEQ: 14.9 INJECTION, SOLUTION INTRAVENOUS at 12:02

## 2023-12-05 RX ADMIN — ENOXAPARIN SODIUM 40 MG: 40 INJECTION SUBCUTANEOUS at 18:19

## 2023-12-05 RX ADMIN — SODIUM CHLORIDE, POTASSIUM CHLORIDE, SODIUM LACTATE AND CALCIUM CHLORIDE 60 ML/HR: 600; 310; 30; 20 INJECTION, SOLUTION INTRAVENOUS at 12:02

## 2023-12-05 RX ADMIN — PROPOFOL 100 MG: 10 INJECTION, EMULSION INTRAVENOUS at 13:50

## 2023-12-05 RX ADMIN — PROPOFOL 200 MG: 10 INJECTION, EMULSION INTRAVENOUS at 13:58

## 2023-12-05 RX ADMIN — CIPROFLOXACIN 400 MG: 400 INJECTION, SOLUTION INTRAVENOUS at 22:47

## 2023-12-05 RX ADMIN — ONDANSETRON 4 MG: 2 INJECTION INTRAMUSCULAR; INTRAVENOUS at 02:16

## 2023-12-05 RX ADMIN — METRONIDAZOLE 500 MG: 500 INJECTION, SOLUTION INTRAVENOUS at 18:19

## 2023-12-05 SDOH — HEALTH STABILITY: MENTAL HEALTH: CURRENT SMOKER: 1

## 2023-12-05 ASSESSMENT — COGNITIVE AND FUNCTIONAL STATUS - GENERAL
MOBILITY SCORE: 24
DAILY ACTIVITIY SCORE: 24

## 2023-12-05 ASSESSMENT — PAIN SCALES - GENERAL
PAINLEVEL_OUTOF10: 0 - NO PAIN
PAIN_LEVEL: 0
PAINLEVEL_OUTOF10: 0 - NO PAIN

## 2023-12-05 ASSESSMENT — PAIN - FUNCTIONAL ASSESSMENT: PAIN_FUNCTIONAL_ASSESSMENT: 0-10

## 2023-12-05 NOTE — ANESTHESIA PREPROCEDURE EVALUATION
Patient: Edilma Benitez    Procedure Information       Date/Time: 12/05/23 1320    Scheduled providers: Marquez Rodriguez MD; Janak Contreras MD    Procedure: COLONOSCOPY    Location: [facility]            Relevant Problems   Anesthesia   (+) Dental caries      Cardiovascular (within normal limits)      Endocrine (within normal limits)      GI   (+) Chronic diarrhea   (+) Crohn's disease of ileum, unspecified complication (CMS/HCC)   (+) Crohn's disease of small intestine without complication (CMS/HCC)   (+) Gastroesophageal reflux disease with esophagitis      /Renal (within normal limits)      Neuro/Psych   (+) Generalized anxiety disorder   (+) Moderate episode of recurrent major depressive disorder (CMS/HCC)      Pulmonary (within normal limits)      GI/Hepatic (within normal limits)      Hematology (within normal limits)      Musculoskeletal (within normal limits)      Eyes, Ears, Nose, and Throat (within normal limits)      Infectious Disease   (+) Dental caries   (+) Onychomycosis of toenail       Clinical information reviewed:    Allergies  Meds     OB Status           NPO Detail:  No data recorded     Physical Exam    Airway  Mallampati: II     Cardiovascular - normal exam     Dental   (+) lower dentures, upper dentures     Pulmonary - normal exam     Abdominal - normal exam           Anesthesia Plan    ASA 2 - emergent     MAC     The patient is a current smoker.    intravenous induction   Anesthetic plan and risks discussed with patient.    Plan discussed with CRNA.

## 2023-12-05 NOTE — PROGRESS NOTES
"PROGRESS NOTE    ASSESSMENT AND PLAN:   Abdominal pain  2.  Crohn's diease  3. Ileocolonic anastomosis stricture    -diagnosed ~20 years ago  -has been off medication past few years as she had been doing so well  -follows with Dr. Rodriguez  -has known ileocolonic anastomosis narrowing that has had to be dilated in the past  -CT here showing mild to moderate wall thickening of approximately 7 cm of distal small bowel extending to the ileocolonic anastomosis with more proximal fluid-filled mildly dilated loops of small bowel  -will check stool studies to r/o infectious etiology prior to starting any steroids  -GI consult; given small bowel dilation may need repeat dilation  -Plan for colonoscopy today possible dilation.     4. Endometriosis  5. Depression/anxiety    -resume home medications      SUBJECTIVE:   Admit Date: 12/3/2023    Interval History: Feels okay, no active complaints.      OBJECTIVE:   Vitals: /53 (Patient Position: Sitting)   Pulse 71   Temp 37.3 °C (99.1 °F)   Resp 15   Ht 1.575 m (5' 2.01\")   Wt 59 kg (130 lb)   LMP 11/12/2023 (Approximate)   SpO2 98%   BMI 23.77 kg/m²    Wt Readings from Last 3 Encounters:   12/03/23 59 kg (130 lb)   08/11/23 60.5 kg (133 lb 6.4 oz)   04/07/23 60.1 kg (132 lb 6.4 oz)      24HR INTAKE/OUTPUT:    Intake/Output Summary (Last 24 hours) at 12/5/2023 1538  Last data filed at 12/5/2023 1405  Gross per 24 hour   Intake 400 ml   Output 100 ml   Net 300 ml       PHYSICAL EXAM:   GENERAL: Laying in bed, does not appear to be in any distress.   HEENT: HEAD: Normocephalic atraumatic.  Neck: Supple.  Eyes: Pupils are reactive to direct light.   CVS: S1, S2 heard. Regular rate and rhythm  LUNGS: Clear to auscultate bilaterally. No wheezing or rhonchi appreciated.  ABDOMEN: Soft, nontender to palpate. Positive bowel sounds. No guarding or rebound appreciated.  NEUROLOGICAL: No focal neurological deficits appreciated. Cranial nerves are grossly intact.  EXTREMITIES: No " "edema appreciated.  SKIN:  Grossly intact, warm and dry.      LABS/IMAGING AND MEDICATIONS:   Scheduled Meds:ciprofloxacin, 400 mg, intravenous, q12h  enoxaparin, 40 mg, subcutaneous, q24h  FLUoxetine, 40 mg, oral, Daily  metroNIDAZOLE, 500 mg, intravenous, q8h      PRN Meds:PRN medications: acetaminophen **OR** acetaminophen **OR** acetaminophen, HYDROmorphone, ondansetron **OR** ondansetron    No lab exists for component: \"CBC\"   No lab exists for component: \"CMP\"   No lab exists for component: \"TROPONIN\"      Results from last 7 days   Lab Units 12/05/23  0642   INR  1.2*     No lab exists for component: \"LIPIDS\"       No lab exists for component: \"URINALYSIS\"          BMP:  Results from last 7 days   Lab Units 12/05/23  0642 12/03/23  0803   SODIUM mmol/L 140 137   POTASSIUM mmol/L 3.2* 3.5   CHLORIDE mmol/L 106 105   CO2 mmol/L 26 25   BUN mg/dL 4* 5*   CREATININE mg/dL 0.69 0.62       CBC:  Results from last 7 days   Lab Units 12/05/23  0642 12/03/23  0803   WBC AUTO x10*3/uL 8.5 18.9*   RBC AUTO x10*6/uL 4.09 4.66   HEMOGLOBIN g/dL 12.1 13.8   HEMATOCRIT % 36.8 41.4   MCV fL 90 89   MCH pg 29.6 29.6   MCHC g/dL 32.9 33.3   RDW % 12.7 12.9   PLATELETS AUTO x10*3/uL 265 327       Cardiac Enzymes:   Results from last 7 days   Lab Units 12/03/23  0803   TROPHS ng/L <3         Hepatic Function Panel:  Results from last 7 days   Lab Units 12/03/23  0803   ALK PHOS U/L 72   ALT U/L 20   AST U/L 13   PROTEIN TOTAL g/dL 6.5   BILIRUBIN TOTAL mg/dL 0.8   BILIRUBIN DIRECT mg/dL 0.1       Magnesium:  Results from last 7 days   Lab Units 12/05/23  0642   MAGNESIUM mg/dL 1.81       Pro-BNP:  No results found for: \"PROBNP\"    INR:  Results from last 7 days   Lab Units 12/05/23  0642   PROTIME seconds 13.2*   INR  1.2*       CMP:  Results from last 7 days   Lab Units 12/05/23  0642 12/03/23  0803   SODIUM mmol/L 140 137   POTASSIUM mmol/L 3.2* 3.5   CHLORIDE mmol/L 106 105   CO2 mmol/L 26 25   BUN mg/dL 4* 5*   CREATININE " mg/dL 0.69 0.62   GLUCOSE mg/dL 92 124*   CALCIUM mg/dL 8.7 8.9   PROTEIN TOTAL g/dL  --  6.5   BILIRUBIN TOTAL mg/dL  --  0.8   ALK PHOS U/L  --  72   AST U/L  --  13   ALT U/L  --  20       Amylase:        Lipase:  Results from last 7 days   Lab Units 12/03/23  0803   LIPASE U/L 22

## 2023-12-05 NOTE — CARE PLAN
The patient's goals for the shift include      The clinical goals for the shift include Patient will state that abdominal pain is tolerable with medication and have no emesis throughout the shift..

## 2023-12-05 NOTE — NURSING NOTE
Huddle and Timeout completed together with team. Patient wristband and JULIA information verified.

## 2023-12-05 NOTE — NURSING NOTE
Patient is sleeping and no longer drinking the bowel prep it isn't complete. Stools still have consistency to them.

## 2023-12-05 NOTE — PROGRESS NOTES
Patient unable to complete bowel prep due to vomiting after trying both Nulytely and MiraLAX with Gatorade.   However, patient has not had anything to eat otherwise since 12/2.      I personally saw the patient's stool this a.m. and it was yellow/clear with only a few flecks of stool.  Administer 2 tap water enemas until clear prior to scheduled colonoscopy today.    hypokalemia this morning at 3.2.    Administer 40 meq IV potassium  stat prior to colonoscopy.  Discussed with bedside RNGiuliana.             Colonoscopy completed today 12/5/2023:  Findings  Post-operative anastomotic stricture (not traversable) in the ascending colon; dilated with balloon dilator from 10 mm starting size to 12 mm end size. Dilation caused improved lumen appearance  There was a large clean-based ulcer in the 6 o'clock position of the ileocolonic anastomosis.  This was biopsied from the margins.  The ascending colon, hepatic flexure, transverse colon, splenic flexure, descending colon, sigmoid colon and rectum appeared normal.;  Performed multiple random forceps biopsies in the ascending colon, descending colon and rectum to rule out colitis        Recommendation    Await pathology results     Schedule repeat colonoscopy in 2-4 weeks. Messaged outpatient GI office (Angela) to schedule         Avoid NSAIDs        Follow up with Dr. Rodriguez in clinic in 6 weeks        Return to [facility] for ongoing care        Continue supportive care per primary team          Plan discussed with Dr. Rodriguez

## 2023-12-05 NOTE — NURSING NOTE
Patient continues to drink the bowel prep very slowly. I informed her that if this doesn not work there is another option and she states that they both make her vomit. It is more the lemon/lime taste. Continuing to encourage her to drink and she has complained of nausea and I gave dose of zofran to help. She reported it did help the nausea.

## 2023-12-05 NOTE — ANESTHESIA POSTPROCEDURE EVALUATION
Patient: Edilma Benitez    Procedure Summary       Date: 12/05/23 Room / Location: [facility]    Anesthesia Start: 1337 Anesthesia Stop: 1405    Procedure: COLONOSCOPY Diagnosis:       Generalized abdominal pain      Abnormal CT of the abdomen    Scheduled Providers: Marquez Rodriguez MD; Janak Contreras MD Responsible Provider: Janak Contreras MD    Anesthesia Type: MAC ASA Status: 2 - Emergent            Anesthesia Type: MAC    Vitals Value Taken Time   /54 12/05/23 1406   Temp 36.6 12/05/23 1406   Pulse 74 12/05/23 1406   Resp 18 12/05/23 1406   SpO2 100 12/05/23 1406       Anesthesia Post Evaluation    Patient location during evaluation: floor  Patient participation: complete - patient participated  Level of consciousness: awake and alert  Pain score: 0  Pain management: adequate  Airway patency: patent  Cardiovascular status: acceptable and stable  Respiratory status: acceptable  Hydration status: acceptable  Postoperative Nausea and Vomiting: none      No notable events documented.

## 2023-12-06 VITALS
WEIGHT: 130 LBS | TEMPERATURE: 98.4 F | DIASTOLIC BLOOD PRESSURE: 55 MMHG | HEART RATE: 65 BPM | HEIGHT: 62 IN | BODY MASS INDEX: 23.92 KG/M2 | RESPIRATION RATE: 16 BRPM | SYSTOLIC BLOOD PRESSURE: 114 MMHG | OXYGEN SATURATION: 96 %

## 2023-12-06 LAB
ANION GAP SERPL CALC-SCNC: 10 MMOL/L (ref 10–20)
BASOPHILS # BLD AUTO: 0.03 X10*3/UL (ref 0–0.1)
BASOPHILS NFR BLD AUTO: 0.2 %
BUN SERPL-MCNC: 3 MG/DL (ref 6–23)
CALCIUM SERPL-MCNC: 8.2 MG/DL (ref 8.6–10.3)
CHLORIDE SERPL-SCNC: 108 MMOL/L (ref 98–107)
CO2 SERPL-SCNC: 25 MMOL/L (ref 21–32)
CREAT SERPL-MCNC: 0.6 MG/DL (ref 0.5–1.05)
EOSINOPHIL # BLD AUTO: 0.42 X10*3/UL (ref 0–0.7)
EOSINOPHIL NFR BLD AUTO: 3.1 %
ERYTHROCYTE [DISTWIDTH] IN BLOOD BY AUTOMATED COUNT: 12.7 % (ref 11.5–14.5)
GFR SERPL CREATININE-BSD FRML MDRD: >90 ML/MIN/1.73M*2
GLUCOSE SERPL-MCNC: 81 MG/DL (ref 74–99)
HCT VFR BLD AUTO: 34.5 % (ref 36–46)
HGB BLD-MCNC: 11.6 G/DL (ref 12–16)
IMM GRANULOCYTES # BLD AUTO: 0.04 X10*3/UL (ref 0–0.7)
IMM GRANULOCYTES NFR BLD AUTO: 0.3 % (ref 0–0.9)
LYMPHOCYTES # BLD AUTO: 1.56 X10*3/UL (ref 1.2–4.8)
LYMPHOCYTES NFR BLD AUTO: 11.5 %
MAGNESIUM SERPL-MCNC: 1.71 MG/DL (ref 1.6–2.4)
MCH RBC QN AUTO: 30.1 PG (ref 26–34)
MCHC RBC AUTO-ENTMCNC: 33.6 G/DL (ref 32–36)
MCV RBC AUTO: 90 FL (ref 80–100)
MONOCYTES # BLD AUTO: 0.88 X10*3/UL (ref 0.1–1)
MONOCYTES NFR BLD AUTO: 6.5 %
NEUTROPHILS # BLD AUTO: 10.58 X10*3/UL (ref 1.2–7.7)
NEUTROPHILS NFR BLD AUTO: 78.4 %
NRBC BLD-RTO: 0 /100 WBCS (ref 0–0)
PLATELET # BLD AUTO: 246 X10*3/UL (ref 150–450)
POTASSIUM SERPL-SCNC: 3.3 MMOL/L (ref 3.5–5.3)
RBC # BLD AUTO: 3.85 X10*6/UL (ref 4–5.2)
SODIUM SERPL-SCNC: 140 MMOL/L (ref 136–145)
WBC # BLD AUTO: 13.5 X10*3/UL (ref 4.4–11.3)

## 2023-12-06 PROCEDURE — 99231 SBSQ HOSP IP/OBS SF/LOW 25: CPT | Performed by: NURSE PRACTITIONER

## 2023-12-06 PROCEDURE — G0378 HOSPITAL OBSERVATION PER HR: HCPCS

## 2023-12-06 PROCEDURE — 80048 BASIC METABOLIC PNL TOTAL CA: CPT | Performed by: INTERNAL MEDICINE

## 2023-12-06 PROCEDURE — 2500000004 HC RX 250 GENERAL PHARMACY W/ HCPCS (ALT 636 FOR OP/ED): Performed by: INTERNAL MEDICINE

## 2023-12-06 PROCEDURE — 99239 HOSP IP/OBS DSCHRG MGMT >30: CPT | Performed by: HOSPITALIST

## 2023-12-06 PROCEDURE — 36415 COLL VENOUS BLD VENIPUNCTURE: CPT | Performed by: INTERNAL MEDICINE

## 2023-12-06 PROCEDURE — 96366 THER/PROPH/DIAG IV INF ADDON: CPT

## 2023-12-06 PROCEDURE — 85025 COMPLETE CBC W/AUTO DIFF WBC: CPT | Performed by: INTERNAL MEDICINE

## 2023-12-06 PROCEDURE — 83735 ASSAY OF MAGNESIUM: CPT | Performed by: INTERNAL MEDICINE

## 2023-12-06 RX ADMIN — CIPROFLOXACIN 400 MG: 400 INJECTION, SOLUTION INTRAVENOUS at 10:31

## 2023-12-06 RX ADMIN — SODIUM CHLORIDE, POTASSIUM CHLORIDE, SODIUM LACTATE AND CALCIUM CHLORIDE 60 ML/HR: 600; 310; 30; 20 INJECTION, SOLUTION INTRAVENOUS at 09:05

## 2023-12-06 RX ADMIN — METRONIDAZOLE 500 MG: 500 INJECTION, SOLUTION INTRAVENOUS at 03:04

## 2023-12-06 ASSESSMENT — COGNITIVE AND FUNCTIONAL STATUS - GENERAL
DAILY ACTIVITIY SCORE: 24
MOBILITY SCORE: 24
MOBILITY SCORE: 24
DAILY ACTIVITIY SCORE: 24

## 2023-12-06 ASSESSMENT — PAIN SCALES - GENERAL: PAINLEVEL_OUTOF10: 0 - NO PAIN

## 2023-12-06 ASSESSMENT — PAIN - FUNCTIONAL ASSESSMENT: PAIN_FUNCTIONAL_ASSESSMENT: 0-10

## 2023-12-06 NOTE — CARE PLAN
The patient's goals for the shift include      The clinical goals for the shift include will have decreased episodes of pain throughout the shift

## 2023-12-06 NOTE — PROGRESS NOTES
Department of Internal Medicine  Gastroenterology  Progress note      Subjective  GI is following for Abdominal pain    Patient is overall clinically improved s/p colonoscopy yesterday with dilation of anastomotic stricture in the ascending colon.  Abdominal pain has improved, now only reporting some lower abdominal cramping since being on her menstrual period.  Tolerating more clear liquids with no nausea/vomiting.    Current Medication    Current Facility-Administered Medications:     acetaminophen (Tylenol) tablet 650 mg, 650 mg, oral, q4h PRN, 650 mg at 12/05/23 1617 **OR** acetaminophen (Tylenol) oral liquid 650 mg, 650 mg, oral, q4h PRN **OR** acetaminophen (Tylenol) suppository 650 mg, 650 mg, rectal, q4h PRN, David Graves MD    ciprofloxacin (Cipro) IVPB 400 mg, 400 mg, intravenous, q12h, David Graves MD, Stopped at 12/06/23 1131    enoxaparin (Lovenox) syringe 40 mg, 40 mg, subcutaneous, q24h, David Graves MD, 40 mg at 12/05/23 1819    FLUoxetine (PROzac) capsule 40 mg, 40 mg, oral, Daily, David Graves MD, 40 mg at 12/05/23 1608    HYDROmorphone PF (Dilaudid) injection 0.2 mg, 0.2 mg, intravenous, q4h PRN, David Graves MD, 0.2 mg at 12/04/23 1941    lactated Ringer's infusion, 60 mL/hr, intravenous, Continuous, David Graves MD, Last Rate: 60 mL/hr at 12/06/23 0905, 60 mL/hr at 12/06/23 0905    metroNIDAZOLE in NaCl (iso-os) (Flagyl)  mg, 500 mg, intravenous, q8h, David Graves MD, Stopped at 12/06/23 0404    ondansetron (Zofran) tablet 4 mg, 4 mg, oral, q8h PRN **OR** ondansetron (Zofran) injection 4 mg, 4 mg, intravenous, q8h PRN, David Graves MD, 4 mg at 12/04/23 2224    Current Outpatient Medications:     FLUoxetine (PROzac) 40 mg capsule, Take 1 capsule (40 mg) by mouth once daily., Disp: 90 capsule, Rfl: 1    ibuprofen (Motrin) capsule, Take 1 capsule (200 mg) by mouth 2 times a day., Disp: , Rfl:     multivitamin tablet, Take 1 tablet by mouth once daily.,  "Disp: , Rfl:     omeprazole (PriLOSEC) 40 mg DR capsule, Take 1 capsule (40 mg) by mouth once daily in the morning. Take before meals., Disp: 30 capsule, Rfl: 5    ondansetron (Zofran) 4 mg tablet, Take 1 tablet (4 mg) by mouth every 6 hours., Disp: , Rfl:     pimecrolimus (Elidel) 1 % cream, Apply 1 Application topically twice a day., Disp: , Rfl:     Past Medical History  Active Ambulatory Problems     Diagnosis Date Noted    Abnormal uterine bleeding (AUB) 02/23/2023    Anastomotic stricture of small intestine 02/23/2023    Atopic dermatitis 02/23/2023    Benign paroxysmal positional vertigo 02/23/2023    Chronic diarrhea 02/23/2023    Chronic fatigue 02/23/2023    Crohn's disease of small intestine without complication (CMS/HCC) 02/23/2023    Epigastric pain 02/23/2023    Gastroesophageal reflux disease with esophagitis 02/23/2023    Generalized anxiety disorder 02/23/2023    Headache 02/23/2023    Hyperglycemia 02/23/2023    Leukocytosis 02/23/2023    Moderate episode of recurrent major depressive disorder (CMS/HCC) 02/23/2023    Onychomycosis of toenail 02/23/2023    Right-sided temporomandibular joint pain-dysfunction syndrome 02/23/2023    Healthcare maintenance 04/07/2023    Preoperative clearance 08/11/2023    Dental caries 08/11/2023    Malocclusion 08/11/2023     Resolved Ambulatory Problems     Diagnosis Date Noted    No Resolved Ambulatory Problems     Past Medical History:   Diagnosis Date    Personal history of diseases of the blood and blood-forming organs and certain disorders involving the immune mechanism     Personal history of other diseases of the digestive system     Personal history of other diseases of the female genital tract        PHYSICAL EXAM  VS: /55   Pulse 65   Temp 36.9 °C (98.4 °F)   Resp 16   Ht 1.575 m (5' 2.01\")   Wt 59 kg (130 lb)   LMP 11/12/2023 (Approximate)   SpO2 96%   BMI 23.77 kg/m²  Body mass index is 23.77 kg/m².       DATA  Recent blood work and " relevant radiology and endoscopic studies were reviewed and discussed with the patient   Results from last 7 days   Lab Units 12/06/23  0649   WBC AUTO x10*3/uL 13.5*   RBC AUTO x10*6/uL 3.85*   HEMOGLOBIN g/dL 11.6*   HEMATOCRIT % 34.5*   MCV fL 90   MCHC g/dL 33.6   RDW % 12.7   PLATELETS AUTO x10*3/uL 246       Results from last 72 hours   Lab Units 12/06/23  0649   SODIUM mmol/L 140   POTASSIUM mmol/L 3.3*   CHLORIDE mmol/L 108*   CO2 mmol/L 25   BUN mg/dL 3*   CREATININE mg/dL 0.60   CALCIUM mg/dL 8.2*       Results from last 72 hours   Lab Units 12/05/23  0642   INR  1.2*           Colonoscopy completed today 12/5/2023:  Findings  Post-operative anastomotic stricture (not traversable) in the ascending colon; dilated with balloon dilator from 10 mm starting size to 12 mm end size. Dilation caused improved lumen appearance  There was a large clean-based ulcer in the 6 o'clock position of the ileocolonic anastomosis.  This was biopsied from the margins.  The ascending colon, hepatic flexure, transverse colon, splenic flexure, descending colon, sigmoid colon and rectum appeared normal.;  Performed multiple random forceps biopsies in the ascending colon, descending colon and rectum to rule out colitis              IMPRESSION/RECOMMENDATIONS  The patient is a 43 y.o. female with signficant past medical history of ?Crohn's disease (diagnosed about age 25 per patient, currently following with Dr. Rodriguez), laparoscopic ileocecectomy in 2008 at [facility] (Dr. Carpenter) for a presumed Crohn's stricture.  However, pathology revealed stage IV endometriosis. Reportedly the patient had endometrial tissue on her ileum, cecum and kidney.  History of pSBO 2/2 stricture at anastomosis.  S/p balloon dilation of her ICA stricture 4/2021.  History of Endometriosis, depression and anxiety  who presents for  abdominal pain that began about 1 PM on 12/2/23.              Assessment  #Abdominal pain with N/V due to recurrent pSBO 2/2 stricture  at anastomosis with repeat dilation on colonoscopy yesterday.  History of balloon dilation of ICA stricture 4/2021- no overt GI bleeding.  Abdominal pain improved.  Nausea/vomiting improved tolerating clear liquids  #Chronic diarrhea - Normally has up to 5 stools a day, no increased frequency.  On antibiotics within the past 3 months due to dental procedure.    #Abnormal CT results-findings suggest enteritis and associated ileus as well as possible mild active Crohn's.  However, unlikely patient actually has crohn's exacerbation since no increased frequency of stool and non-bloody stools. CRP and sed rate both normal.   #S/p ileocecectomy 2008 for endometriosis  #Leukocytosis            Recommendation    Await pathology results     Schedule repeat colonoscopy in 2-4 weeks. Messaged outpatient GI office (Angela) to schedule         Avoid NSAIDs        Follow up with Dr. Rodriguez in clinic in 6 weeks                    Plan discussed with Dr. Rodriguez.  GI will sign off  Total of 30 minutes spent on visit and overall professional care of the patient including patient education.  All questions answered    (Electronically signed byMALLORY Elder on 12/6/2023 at 1:23 PM)

## 2023-12-06 NOTE — DISCHARGE SUMMARY
DISCHARGE DIAGNOSIS     Abdominal pain  Crohn's disease with ileocolonic anastomotic stricture  Endometriosis  Anxiety  Depression    HOSPITAL COURSE AND DETAILS     Ms. Mcgraw is a 43-year-old with past medical history of Crohn's disease diagnosed over 2 years ago, history of known ileocolonic anastomosis narrowing which has been dilated in the past came into the emergency room with abdominal pain.    CT abdomen pelvis showed mild to moderate wall thickening approximately 7 cm of the distal small bowel extending to the ileocolonic anastomosis site.  She was initially started on Cipro and Flagyl for possible Crohn's disease exacerbation.    She was seen by GI, underwent a colonoscopy which showed a postoperative anastomotic stricture which was dilated.    Postprocedure patient felt subjectively much better, abdominal pain had improved, otherwise had no active complaints.    She was discharged home advised to continue a soft diet advised to advance slowly.  Outpatient follow-up with GI for repeat colonoscopy.    She was also discontinued off IV antibiotics prior to discharge.      Total time spent on discharge planning and coordination of care 40 minutes.  Discharge planning was discussed with patient, she understands and agrees with plan of care.    * Some of these notes were completed using Dragon voice recognition technology and may include unintended areas with respect to translation of word, typographical errors or primary areas which may not have been identified prior to finalization of the document.        DISCHARGE PHYSICAL EXAM     Last Recorded Vitals:  Vitals:    12/05/23 1525 12/05/23 2006 12/06/23 0128 12/06/23 0813   BP: 110/53 104/61 107/55 114/55   BP Location:       Patient Position: Sitting      Pulse: 71 66 69 65   Resp: 15 16 17 16   Temp: 37.3 °C (99.1 °F) 37 °C (98.6 °F) 36.6 °C (97.9 °F) 36.9 °C (98.4 °F)   TempSrc:       SpO2: 98% 97% 95% 96%   Weight:       Height:           Physical  Exam  PHYSICAL EXAM:   GENERAL: Laying in bed, does not appear to be in any distress.   HEENT: HEAD: Normocephalic atraumatic.  Neck: Supple.  Eyes: Pupils are reactive to direct light.   CVS: S1, S2 heard. Regular rate and rhythm  LUNGS: Clear to auscultate bilaterally. No wheezing or rhonchi appreciated.  ABDOMEN: Soft, nontender to palpate. Positive bowel sounds. No guarding or rebound appreciated.  NEUROLOGICAL: No focal neurological deficits appreciated. Cranial nerves are grossly intact.  EXTREMITIES:  No edema appreciated.  SKIN:  Grossly intact, warm and dry.    DISCHARGE MEDICATIONS        Your medication list        CONTINUE taking these medications        Instructions Last Dose Given Next Dose Due   FLUoxetine 40 mg capsule  Commonly known as: PROzac      Take 1 capsule (40 mg) by mouth once daily.       ibuprofen capsule  Commonly known as: Motrin           multivitamin tablet           omeprazole 40 mg DR capsule  Commonly known as: PriLOSEC      Take 1 capsule (40 mg) by mouth once daily in the morning. Take before meals.       ondansetron 4 mg tablet  Commonly known as: Zofran           pimecrolimus 1 % cream  Commonly known as: Elidel                  STOP taking these medications      clindamycin 150 mg capsule  Commonly known as: Cleocin                   OUTPATIENT FOLLOW-UP     No future appointments.

## 2023-12-06 NOTE — DISCHARGE INSTRUCTIONS
It was nice caring for you during your stay at [facility]. As we discussed,    Please continue a soft diet and advance slowly as tolerated    Wishing you a healthy recovery!

## 2023-12-07 ENCOUNTER — PATIENT OUTREACH (OUTPATIENT)
Dept: PRIMARY CARE | Facility: CLINIC | Age: 43
End: 2023-12-07
Payer: COMMERCIAL

## 2023-12-07 NOTE — PROGRESS NOTES
Discharge Facility: [facility]  Discharge Diagnosis:  Abdominal pain  Crohn's disease with ileocolonic anastomotic stricture  Endometriosis  Anxiety  Depression  Admission Date: 12/3/23  Discharge Date: 12/6/23    PCP Appointment Date: TBD - task to office  Specialist Appointment Date:   -[facility] (Gastroenterology) in 6 weeks; Repeat colonoscopy in 2-4 weeks.   - Dr. Rodriguez in the office in 6 weeks   Hospital Encounter and Summary: Linked   See discharge assessment below for further details: N/A    TCM call completed, however was unable to reach patient during two business days after discharge despite at least two attempts made.

## 2023-12-13 LAB — HOLD SPECIMEN: NORMAL

## 2023-12-14 LAB
LABORATORY COMMENT REPORT: NORMAL
PATH REPORT.FINAL DX SPEC: NORMAL
PATH REPORT.GROSS SPEC: NORMAL
PATH REPORT.TOTAL CANCER: NORMAL

## 2023-12-19 ENCOUNTER — ANESTHESIA EVENT (OUTPATIENT)
Dept: GASTROENTEROLOGY | Facility: HOSPITAL | Age: 43
End: 2023-12-19
Payer: COMMERCIAL

## 2023-12-20 ENCOUNTER — ANESTHESIA (OUTPATIENT)
Dept: GASTROENTEROLOGY | Facility: HOSPITAL | Age: 43
End: 2023-12-20
Payer: COMMERCIAL

## 2023-12-20 ENCOUNTER — HOSPITAL ENCOUNTER (OUTPATIENT)
Dept: GASTROENTEROLOGY | Facility: HOSPITAL | Age: 43
Discharge: HOME | End: 2023-12-20
Payer: COMMERCIAL

## 2023-12-20 VITALS
WEIGHT: 118.39 LBS | OXYGEN SATURATION: 100 % | BODY MASS INDEX: 21.79 KG/M2 | RESPIRATION RATE: 16 BRPM | HEIGHT: 62 IN | SYSTOLIC BLOOD PRESSURE: 126 MMHG | DIASTOLIC BLOOD PRESSURE: 71 MMHG | TEMPERATURE: 97.2 F | HEART RATE: 78 BPM

## 2023-12-20 DIAGNOSIS — K91.30 ANASTOMOTIC STRICTURE OF SMALL INTESTINE: ICD-10-CM

## 2023-12-20 DIAGNOSIS — K91.89 ANASTOMOTIC STRICTURE OF SMALL INTESTINE: ICD-10-CM

## 2023-12-20 LAB — PREGNANCY TEST URINE, POC: NEGATIVE

## 2023-12-20 PROCEDURE — 7100000010 HC PHASE TWO TIME - EACH INCREMENTAL 1 MINUTE

## 2023-12-20 PROCEDURE — 45378 DIAGNOSTIC COLONOSCOPY: CPT | Performed by: INTERNAL MEDICINE

## 2023-12-20 PROCEDURE — C1726 CATH, BAL DIL, NON-VASCULAR: HCPCS

## 2023-12-20 PROCEDURE — 81025 URINE PREGNANCY TEST: CPT | Performed by: STUDENT IN AN ORGANIZED HEALTH CARE EDUCATION/TRAINING PROGRAM

## 2023-12-20 PROCEDURE — 2500000004 HC RX 250 GENERAL PHARMACY W/ HCPCS (ALT 636 FOR OP/ED): Performed by: NURSE ANESTHETIST, CERTIFIED REGISTERED

## 2023-12-20 PROCEDURE — 3700000002 HC GENERAL ANESTHESIA TIME - EACH INCREMENTAL 1 MINUTE

## 2023-12-20 PROCEDURE — 3700000001 HC GENERAL ANESTHESIA TIME - INITIAL BASE CHARGE

## 2023-12-20 PROCEDURE — 2720000007 HC OR 272 NO HCPCS

## 2023-12-20 PROCEDURE — 2500000005 HC RX 250 GENERAL PHARMACY W/O HCPCS: Performed by: NURSE ANESTHETIST, CERTIFIED REGISTERED

## 2023-12-20 PROCEDURE — 2500000004 HC RX 250 GENERAL PHARMACY W/ HCPCS (ALT 636 FOR OP/ED): Performed by: STUDENT IN AN ORGANIZED HEALTH CARE EDUCATION/TRAINING PROGRAM

## 2023-12-20 PROCEDURE — 7100000009 HC PHASE TWO TIME - INITIAL BASE CHARGE

## 2023-12-20 RX ORDER — PROPOFOL 10 MG/ML
INJECTION, EMULSION INTRAVENOUS AS NEEDED
Status: DISCONTINUED | OUTPATIENT
Start: 2023-12-20 | End: 2023-12-20

## 2023-12-20 RX ORDER — SODIUM CHLORIDE, SODIUM LACTATE, POTASSIUM CHLORIDE, CALCIUM CHLORIDE 600; 310; 30; 20 MG/100ML; MG/100ML; MG/100ML; MG/100ML
50 INJECTION, SOLUTION INTRAVENOUS CONTINUOUS
Status: DISCONTINUED | OUTPATIENT
Start: 2023-12-20 | End: 2023-12-21 | Stop reason: HOSPADM

## 2023-12-20 RX ORDER — PROPOFOL 10 MG/ML
INJECTION, EMULSION INTRAVENOUS CONTINUOUS PRN
Status: DISCONTINUED | OUTPATIENT
Start: 2023-12-20 | End: 2023-12-20

## 2023-12-20 RX ORDER — LIDOCAINE HYDROCHLORIDE 20 MG/ML
INJECTION, SOLUTION EPIDURAL; INFILTRATION; INTRACAUDAL; PERINEURAL AS NEEDED
Status: DISCONTINUED | OUTPATIENT
Start: 2023-12-20 | End: 2023-12-20

## 2023-12-20 RX ADMIN — PROPOFOL 100 MG: 10 INJECTION, EMULSION INTRAVENOUS at 15:16

## 2023-12-20 RX ADMIN — LIDOCAINE HYDROCHLORIDE 60 MG: 20 INJECTION, SOLUTION EPIDURAL; INFILTRATION; INTRACAUDAL; PERINEURAL at 15:16

## 2023-12-20 RX ADMIN — SODIUM CHLORIDE, POTASSIUM CHLORIDE, SODIUM LACTATE AND CALCIUM CHLORIDE 50 ML/HR: 600; 310; 30; 20 INJECTION, SOLUTION INTRAVENOUS at 13:38

## 2023-12-20 RX ADMIN — PROPOFOL 100 MCG/KG/MIN: 10 INJECTION, EMULSION INTRAVENOUS at 15:16

## 2023-12-20 RX ADMIN — PROPOFOL 50 MG: 10 INJECTION, EMULSION INTRAVENOUS at 15:21

## 2023-12-20 RX ADMIN — PROPOFOL 30 MG: 10 INJECTION, EMULSION INTRAVENOUS at 15:31

## 2023-12-20 SDOH — HEALTH STABILITY: MENTAL HEALTH: CURRENT SMOKER: 0

## 2023-12-20 ASSESSMENT — PAIN - FUNCTIONAL ASSESSMENT
PAIN_FUNCTIONAL_ASSESSMENT: 0-10

## 2023-12-20 ASSESSMENT — PAIN SCALES - GENERAL
PAINLEVEL_OUTOF10: 0 - NO PAIN
PAIN_LEVEL: 0

## 2023-12-20 ASSESSMENT — COLUMBIA-SUICIDE SEVERITY RATING SCALE - C-SSRS
1. IN THE PAST MONTH, HAVE YOU WISHED YOU WERE DEAD OR WISHED YOU COULD GO TO SLEEP AND NOT WAKE UP?: NO
2. HAVE YOU ACTUALLY HAD ANY THOUGHTS OF KILLING YOURSELF?: NO
6. HAVE YOU EVER DONE ANYTHING, STARTED TO DO ANYTHING, OR PREPARED TO DO ANYTHING TO END YOUR LIFE?: NO

## 2023-12-20 NOTE — H&P
Outpatient Hospital Procedure H&P    Patient Profile-Procedures  Initial Info  Patient Demographics  Name Edilma Benitez  Date of Birth 1980  MRN [id number]  [place]   [address]  [facility] [id number] [place] [address]    Primary Phone Number [phone]  Secondary Phone Number    Henry Brito    Procedure(s):  Colonoscopy with dilation    Primary contact name and number   Extended Emergency Contact Information  Primary Emergency Contact: Marquez Benitez  Home Phone: [phone]  Work Phone: [phone]  Relation: Spouse  Secondary Emergency Contact: Laura Tejada  Address: [address] [place] [address] [place] [facility]  Mobile Phone: [phone]  Relation: Mother    General Health  Weight   Vitals:    12/20/23 1314   Weight: 53.7 kg (118 lb 6.2 oz)     BMI Body mass index is 21.65 kg/m².    Allergies  Allergies   Allergen Reactions    Cefaclor Hives    Morphine Itching and Swelling       Past Medical History   Past Medical History:   Diagnosis Date    Abnormal uterine bleeding (AUB)     Anxiety     Crohn's disease (CMS/HCC)     Depression     Endometriosis     Fatigue     GERD (gastroesophageal reflux disease)     H/O small bowel obstruction     Personal history of diseases of the blood and blood-forming organs and certain disorders involving the immune mechanism     History of anemia    Vertigo     Wears dentures     Wears glasses        Provider assessment  Diagnosis: IC anastomotic stricture    Medication Reviewed - yes  Prior to Admission medications    Medication Sig Start Date End Date Taking? Authorizing Provider   FLUoxetine (PROzac) 40 mg capsule Take 1 capsule (40 mg) by mouth once daily. 8/11/23  Yes Henry Wallace MD   ibuprofen (Motrin) capsule Take 1 capsule (200 mg) by mouth 2 times a day.   Yes Historical Provider, MD   multivitamin tablet Take 1 tablet by mouth once daily.   Yes Historical Provider, MD   pimecrolimus (Elidel) 1 % cream Apply 1  Application topically twice a day. 10/20/21  Yes Historical Provider, MD   omeprazole (PriLOSEC) 40 mg DR capsule Take 1 capsule (40 mg) by mouth once daily in the morning. Take before meals. 4/7/23 8/11/23  Henry Wallace MD   ondansetron (Zofran) 4 mg tablet Take 1 tablet (4 mg) by mouth every 6 hours. 8/3/21   Historical Provider, MD       Physical Exam  Vitals:    12/20/23 1314   BP: 96/75   Pulse: 91   Resp: 16   Temp: 36.8 °C (98.2 °F)   SpO2: 99%        General: A&Ox3, NAD.  HEENT: AT/NC.   CV: RRR. No murmur.  Resp: CTA bilaterally. No wheezing, rhonchi or rales.   GI: Soft, NT/ND. BSx4.  Extrem: No edema. Pulses intact.  Skin: No Jaundice.   Neuro: No focal deficits.   Psych: Normal mood and affect.        Oropharyngeal Classification II (hard and soft palate, upper portion of tonsils anduvula visible)  ASA PS Classification 2  Sedation Plan Deep  Procedure Plan - pre-procedural (re)assesment completed by physician:  discharge/transfer patient when discharge criteria met    Marquez Rodriguez MD  12/20/2023 2:25 PM    a

## 2023-12-20 NOTE — DISCHARGE INSTRUCTIONS
Patient Instructions after a Colonoscopy      The anesthetics, sedatives or narcotics which were given to you today will be acting in your body for the next 24 hours, so you might feel a little sleepy or groggy.  This feeling should slowly wear off. Carefully read and follow the instructions.     You received sedation today:  - Do not drive or operate any machinery or power tools of any kind.   - No alcoholic beverages today, not even beer or wine.  - Do not make any important decisions or sign any legal documents.  - No over the counter medications that contain alcohol or that may cause drowsiness.  - Do not make any important decisions or sign any legal documents.    While it is common to experience mild to moderate abdominal distention, gas, or belching after your procedure, if any of these symptoms occur following discharge from the GI Lab or within one week of having your procedure, call the [facility] to be advised whether a visit to your nearest Urgent Care or Emergency Department is indicated.  Take this paper with you if you go.     - If you develop an allergic reaction to the medications that were given during your procedure such as difficulty breathing, rash, hives, severe nausea, vomiting or lightheadedness.  - If you experience chest pain, shortness of breath, severe abdominal pain, fevers and chills.  -If you develop signs and symptoms of bleeding such as blood in your spit, if your stools turn black, tarry, or bloody  - If you have not urinated within 8 hours following your procedure.  - If your IV site becomes painful, red, inflamed, or looks infected.    If you received a biopsy/polypectomy/sphincterotomy the following instructions apply below:    __ Do not use Aspirin containing products, non-steroidal medications or anti-coagulants for one week following your procedure. (Examples of these types of medications are: Advil, Arthrotec, Aleve, Coumadin, Ecotrin, Heparin, Ibuprofen,  Indocin, Motrin, Naprosyn, Nuprin, Plavix, Vioxx, and Voltarin, or their generic forms.  This list is not all-inclusive.  Check with your physician or pharmacist before resuming medications.)   __ Eat a soft diet today.  Avoid foods that are poorly digested for the next 24 hours.  These foods would include: nuts, beans, lettuce, red meats, and fried foods. Start with liquids and advance your diet as tolerated, gradually work up to eating solids.   __ Do not have a Barium Study or Enema for one week.    Your physician recommends the additional following instructions:    -You have a contact number available for emergencies. The signs and symptoms of potential delayed complications were discussed with you. You may return to normal activities tomorrow.  -Resume your previous diet.  -Continue your present medications.   -We are waiting for your pathology results.  -Your physician has recommended a repeat colonoscopy (date to be determined after pending pathology results are reviewed) for surveillance based on pathology results.  -The findings and recommendations have been discussed with you.  -The findings and recommendations were discussed with your family.  - Please see Medication Reconciliation Form for new medication/medications prescribed.       If you experience any problems or have any questions following discharge from the GI Lab, please call:  Before 5p.m.  [phone]  After 5p.m.    [phone]    Nurse Signature                                                                        Date___________________                                                                            Patient/Responsible Party Signature                                        Date___________________

## 2023-12-20 NOTE — ANESTHESIA POSTPROCEDURE EVALUATION
Patient: Edilma Benitez    Procedure Summary       Date: 12/20/23 Room / Location: [facility]    Anesthesia Start: 1513 Anesthesia Stop: 1538    Procedure: COLONOSCOPY Diagnosis: Anastomotic stricture of small intestine    Scheduled Providers: Marquez Rodriguez MD; Juan Scherer DO; Fredy José RN; Maria Isabel Ivan Responsible Provider: RMOAN Rebolledo    Anesthesia Type: MAC ASA Status: 2            Anesthesia Type: MAC    Vitals Value Taken Time   /71 12/20/23 1539   Temp - 12/20/23 1539   Pulse 78 12/20/23 1539   Resp 17 12/20/23 1539   SpO2 98 12/20/23 1539       Anesthesia Post Evaluation    Patient location during evaluation: bedside  Patient participation: complete - patient participated  Level of consciousness: sleepy but conscious  Pain score: 0  Pain management: adequate  Airway patency: patent  Cardiovascular status: acceptable and hemodynamically stable  Respiratory status: acceptable, room air and nonlabored ventilation  Hydration status: acceptable  Postoperative Nausea and Vomiting: none        No notable events documented.

## 2023-12-20 NOTE — ANESTHESIA PREPROCEDURE EVALUATION
Patient: Edilma Benitez    Procedure Information       Date/Time: 12/20/23 1610    Scheduled providers: Marquez Rodriguez MD; Juan Scherer DO; Fredy José RN; Maria Isabel Ivan    Procedure: COLONOSCOPY    Location: [facility]            Relevant Problems   Anesthesia   (+) Dental caries      GI   (+) Chronic diarrhea   (+) Crohn's disease of ileum, unspecified complication (CMS/HCC)   (+) Crohn's disease of small intestine without complication (CMS/HCC)   (+) Gastroesophageal reflux disease with esophagitis      Neuro/Psych   (+) Generalized anxiety disorder   (+) Moderate episode of recurrent major depressive disorder (CMS/HCC)      Infectious Disease   (+) Dental caries   (+) Onychomycosis of toenail       Clinical information reviewed:    Allergies      OB Status           NPO Detail:  NPO/Void Status  Date of Last Liquid: 12/20/23  Time of Last Liquid: 0600 (tea  4oz)  Date of Last Solid: 12/06/23 (has been on liquid diet  was in hospital with small bowel obstruction)         Physical Exam    Airway  Mallampati: II  TM distance: >3 FB  Neck ROM: full     Cardiovascular - normal exam     Dental   (+) upper dentures, lower dentures     Pulmonary - normal exam     Abdominal            Anesthesia Plan    ASA 2     MAC     The patient is not a current smoker.    intravenous induction   Anesthetic plan and risks discussed with patient.    Plan discussed with CRNA.

## 2023-12-21 ENCOUNTER — APPOINTMENT (OUTPATIENT)
Dept: GASTROENTEROLOGY | Facility: HOSPITAL | Age: 43
End: 2023-12-21
Payer: COMMERCIAL

## 2023-12-29 ENCOUNTER — PATIENT OUTREACH (OUTPATIENT)
Dept: PRIMARY CARE | Facility: CLINIC | Age: 43
End: 2023-12-29
Payer: COMMERCIAL

## 2023-12-29 NOTE — PROGRESS NOTES
Unable to reach patient for call back after patient's follow up appointment with PCP.   KEVINM with call back number for patient to call if needed   If no voicemail available call attempts x 2 were made to contact the patient to assist with any questions or concerns patient may have.

## 2024-01-23 ENCOUNTER — OFFICE VISIT (OUTPATIENT)
Dept: GASTROENTEROLOGY | Facility: CLINIC | Age: 44
End: 2024-01-23
Payer: COMMERCIAL

## 2024-01-23 VITALS
WEIGHT: 126 LBS | RESPIRATION RATE: 16 BRPM | HEIGHT: 62 IN | OXYGEN SATURATION: 98 % | HEART RATE: 93 BPM | BODY MASS INDEX: 23.19 KG/M2 | DIASTOLIC BLOOD PRESSURE: 76 MMHG | SYSTOLIC BLOOD PRESSURE: 122 MMHG

## 2024-01-23 DIAGNOSIS — K91.89 ANASTOMOTIC STRICTURE OF SMALL INTESTINE: Primary | ICD-10-CM

## 2024-01-23 DIAGNOSIS — K91.30 ANASTOMOTIC STRICTURE OF SMALL INTESTINE: Primary | ICD-10-CM

## 2024-01-23 PROCEDURE — 99214 OFFICE O/P EST MOD 30 MIN: CPT | Performed by: INTERNAL MEDICINE

## 2024-01-23 NOTE — PATIENT INSTRUCTIONS
Check calprotectin today.  Repeat colonoscopy with dilation as needed.  Consider seeing colorectal surgery.  Check MR enterography.

## 2024-01-23 NOTE — PROGRESS NOTES
Gastroenterology Office Visit     History of Present Illness:   Edilma Benitez is a 43 y.o. female who presents to GI clinic for follow up of abdominal pain and anastomotic stricture.  Since last OV in 6/21, patient has had colonoscopy with dilation of her anastomotic stricture x2 in 12/23 (she has been dilated by me x3 overall).  Admitted to [facility] last month for abdominal pain (just superior to umbilicus), n/v- this improved significantly after dilation of the stricture. No recurrence of symptoms.  Random colon biopsies were normal; bx from the anastomosis showed chronic active enteritis.  CRP was 0.2 last month.     OV in 6/21:  Since her last visit with me in 2/21, she underwent CT enterography, EGD, and colonoscopy. The CT enterography in 3/21 did not clearly show the stricture at the anastomosis, so was assumed that it was short. Her stricture at the anastomosis was dilated to 15 mm during her colonoscopy in 4/21. Her EGD in 4/21 showed grade B esophagitis, and she was started on daily Prilosec for 12 weeks. Stomach biopsies were negative for H. pylori.     Since the dilation, she has not had any abdominal pain. She is taking omeprazole daily now, and has no heartburn or nausea. Overall she is much improved with these 2 interventions.    Prior Hx:  The patient underwent a laparoscopic ileocecectomy in 2008 at the [facility] (Dr. Carpenter) for a presumed Crohn's stricture. However, pathology revealed stage IV endometriosis. The patient reports having endometrial tissue on her ileum, cecum, and kidney. She was having diarrhea prior to the surgery- approximately 2 stools per day. The diarrhea has worsened since the surgery. She now has up to 10 loose stools per day. She was tried on Questran, but could not tolerate this. She sees occasional bright red blood on the toilet paper, and in the toilet bowl.     Review of Systems  Constitutional: denies fever/ chills, night sweats, wt loss and fatigue  Respiratory: denies SOB,  ARAYA  CV: denies chest pain and LE edema  Neuro: denies weakness and difficulty walking      Past Medical History   has a past medical history of Abnormal uterine bleeding (AUB), Anxiety, Crohn's disease (CMS/HCC), Depression, Endometriosis, Fatigue, GERD (gastroesophageal reflux disease), H/O small bowel obstruction, Personal history of diseases of the blood and blood-forming organs and certain disorders involving the immune mechanism, Vertigo, Wears dentures, and Wears glasses.     Problem List  Patient Active Problem List   Diagnosis    Abnormal uterine bleeding (AUB)    Anastomotic stricture of small intestine    Atopic dermatitis    Benign paroxysmal positional vertigo    Chronic diarrhea    Chronic fatigue    Crohn's disease of small intestine without complication (CMS/HCC)    Epigastric pain    Gastroesophageal reflux disease with esophagitis    Generalized anxiety disorder    Headache    Hyperglycemia    Leukocytosis    Moderate episode of recurrent major depressive disorder (CMS/HCC)    Onychomycosis of toenail    Right-sided temporomandibular joint pain-dysfunction syndrome    Healthcare maintenance    Preoperative clearance    Dental caries    Malocclusion    Crohn's disease of ileum, unspecified complication (CMS/HCC)       Past Surgical History  Past Surgical History:   Procedure Laterality Date    COLECTOMY      COLON STRICTURE DILATATION      ileocolonic anastomosis stricture    COLONOSCOPY      DILATION AND CURETTAGE OF UTERUS      ENDOMETRIAL ABLATION      UPPER GASTROINTESTINAL ENDOSCOPY         Social History   reports that she has been smoking cigarettes. She has a 10.00 pack-year smoking history. She has never used smokeless tobacco. She reports that she does not drink alcohol and does not use drugs.     Family History  family history is not on file.       Allergies  Allergies   Allergen Reactions    Cefaclor Hives    Morphine Itching and Swelling       Medications  Current Outpatient Medications  "  Medication Instructions    FLUoxetine (PROZAC) 40 mg, oral, Daily    ibuprofen (MOTRIN) 200 mg, oral, 2 times daily    multivitamin tablet 1 tablet, oral, Daily    omeprazole (PRILOSEC) 40 mg, oral, Daily before breakfast    ondansetron (ZOFRAN) 4 mg, oral, Every 6 hours    pimecrolimus (Elidel) 1 % cream 1 Application, Topical, 2 times daily        Objective   Blood pressure 122/76, pulse 93, resp. rate 16, height 1.575 m (5' 2\"), weight 57.2 kg (126 lb), SpO2 98 %.          LABS  Component      Latest Ref Rng 12/6/2023   WBC      4.4 - 11.3 x10*3/uL 13.5 (H)    nRBC      0.0 - 0.0 /100 WBCs 0.0    RBC      4.00 - 5.20 x10*6/uL 3.85 (L)    HEMOGLOBIN      12.0 - 16.0 g/dL 11.6 (L)    HEMATOCRIT      36.0 - 46.0 % 34.5 (L)    MCV      80 - 100 fL 90    MCH      26.0 - 34.0 pg 30.1    MCHC      32.0 - 36.0 g/dL 33.6    RED CELL DISTRIBUTION WIDTH      11.5 - 14.5 % 12.7    Platelets      150 - 450 x10*3/uL 246             Radiology  CT A/P 12/23: Right lower quadrant ileocolonic anastomosis noted. Mild to moderate  wall thickening of approximately 7 cm of distal small bowel extending  to the ileocolonic anastomosis with more proximal fluid-filled mildly  dilated loops of small bowel measuring up to approximately 3 cm.  Overall appearance is similar compared to 8/14/2020.    Endoscopy    Colonoscopy 12/23: normal colon; ICA dilated to 15 mm      Assessment/Plan   Edilma Benitez is a 43 y.o. female who presents to GI clinic for follow up of abdominal pain and ICA stricture.  Obstructive sx's have resolved after dilation of ICA stricture.    1) abdominal pain- recurrent pSBO secondary to stricture at anastomosis. she is s/p balloon dilation of her ICA stricture x3   2) s/p ileocecectomy for endometriosis in 2008  3) h/o endometriosis        Anastomotic stricture of small intestine  Check calprotectin today.  Repeat colonoscopy with dilation as needed.  Consider seeing colorectal surgery.  Check MR " enterography.  Return to clinic in 6 months.     Marquez Rodriguez MD

## 2024-03-01 DIAGNOSIS — F33.1 MODERATE EPISODE OF RECURRENT MAJOR DEPRESSIVE DISORDER (MULTI): ICD-10-CM

## 2024-03-01 DIAGNOSIS — F41.1 GENERALIZED ANXIETY DISORDER: ICD-10-CM

## 2024-03-06 ENCOUNTER — TELEPHONE (OUTPATIENT)
Dept: PRIMARY CARE | Facility: CLINIC | Age: 44
End: 2024-03-06
Payer: COMMERCIAL

## 2024-03-06 DIAGNOSIS — F33.1 MODERATE EPISODE OF RECURRENT MAJOR DEPRESSIVE DISORDER (MULTI): ICD-10-CM

## 2024-03-06 DIAGNOSIS — F41.1 GENERALIZED ANXIETY DISORDER: ICD-10-CM

## 2024-03-06 RX ORDER — FLUOXETINE HYDROCHLORIDE 40 MG/1
40 CAPSULE ORAL DAILY
Qty: 90 CAPSULE | Refills: 1 | OUTPATIENT
Start: 2024-03-06

## 2024-03-06 NOTE — TELEPHONE ENCOUNTER
Dr. Wallace doesn't have any availablity until June and dewey said she can't wait that long she's already gone five days without her medication and needs the next appt available because she can't go any longer. Please advise

## 2024-03-06 NOTE — TELEPHONE ENCOUNTER
Rx Refill Request Telephone Encounter     Last Fill 2024     Name:  Edilma Benitez  :  [id number]  Medication Name:  FLUoxetine (PROzac) 40 mg capsule     Specific Pharmacy location:  [address]  Date of last appointment:  23 for PAT   Date of next appointment:  na  Best number to reach patient:  [phone]

## 2024-03-07 RX ORDER — FLUOXETINE HYDROCHLORIDE 40 MG/1
40 CAPSULE ORAL DAILY
Qty: 90 CAPSULE | Refills: 1 | OUTPATIENT
Start: 2024-03-07

## 2024-03-08 ENCOUNTER — OFFICE VISIT (OUTPATIENT)
Dept: PRIMARY CARE | Facility: CLINIC | Age: 44
End: 2024-03-08
Payer: COMMERCIAL

## 2024-03-08 VITALS
TEMPERATURE: 97.7 F | SYSTOLIC BLOOD PRESSURE: 110 MMHG | WEIGHT: 125.8 LBS | BODY MASS INDEX: 23.15 KG/M2 | DIASTOLIC BLOOD PRESSURE: 68 MMHG | OXYGEN SATURATION: 98 % | HEART RATE: 71 BPM | HEIGHT: 62 IN

## 2024-03-08 DIAGNOSIS — F41.1 GENERALIZED ANXIETY DISORDER: ICD-10-CM

## 2024-03-08 DIAGNOSIS — Z00.00 HEALTHCARE MAINTENANCE: Primary | ICD-10-CM

## 2024-03-08 DIAGNOSIS — F33.1 MODERATE EPISODE OF RECURRENT MAJOR DEPRESSIVE DISORDER (MULTI): ICD-10-CM

## 2024-03-08 DIAGNOSIS — K50.019 CROHN'S DISEASE OF SMALL INTESTINE WITH UNSPECIFIED COMPLICATIONS (MULTI): ICD-10-CM

## 2024-03-08 PROCEDURE — 4004F PT TOBACCO SCREEN RCVD TLK: CPT | Performed by: FAMILY MEDICINE

## 2024-03-08 PROCEDURE — 99396 PREV VISIT EST AGE 40-64: CPT | Performed by: FAMILY MEDICINE

## 2024-03-08 RX ORDER — FLUOXETINE HYDROCHLORIDE 40 MG/1
40 CAPSULE ORAL DAILY
Qty: 90 CAPSULE | Refills: 1 | Status: SHIPPED | OUTPATIENT
Start: 2024-03-08

## 2024-03-08 ASSESSMENT — ENCOUNTER SYMPTOMS
DECREASED CONCENTRATION: 1
SLEEP QUALITY: FAIR
NIGHTTIME AWAKENINGS: SEVERAL
HOPELESSNESS: 1
NERVOUS/ANXIOUS: 1
HOURS OF SLEEP PER NIGHT: 5 HOURS
DEPRESSED MOOD: 1
FEELINGS OF WORTHLESSNESS: 1
DEPRESSION: 1
IRRITABILITY: 1
THOUGHTS THAT DEATH WOULD BE EASIER: 0

## 2024-03-08 ASSESSMENT — ANXIETY QUESTIONNAIRES
4. TROUBLE RELAXING: NEARLY EVERY DAY
6. BECOMING EASILY ANNOYED OR IRRITABLE: NEARLY EVERY DAY
GAD7 TOTAL SCORE: 21
5. BEING SO RESTLESS THAT IT IS HARD TO SIT STILL: NEARLY EVERY DAY
2. NOT BEING ABLE TO STOP OR CONTROL WORRYING: NEARLY EVERY DAY
IF YOU CHECKED OFF ANY PROBLEMS ON THIS QUESTIONNAIRE, HOW DIFFICULT HAVE THESE PROBLEMS MADE IT FOR YOU TO DO YOUR WORK, TAKE CARE OF THINGS AT HOME, OR GET ALONG WITH OTHER PEOPLE: VERY DIFFICULT
1. FEELING NERVOUS, ANXIOUS, OR ON EDGE: NEARLY EVERY DAY
3. WORRYING TOO MUCH ABOUT DIFFERENT THINGS: NEARLY EVERY DAY
7. FEELING AFRAID AS IF SOMETHING AWFUL MIGHT HAPPEN: NEARLY EVERY DAY

## 2024-03-08 ASSESSMENT — PATIENT HEALTH QUESTIONNAIRE - PHQ9
2. FEELING DOWN, DEPRESSED OR HOPELESS: NEARLY EVERY DAY
SUM OF ALL RESPONSES TO PHQ9 QUESTIONS 1 AND 2: 6
1. LITTLE INTEREST OR PLEASURE IN DOING THINGS: NEARLY EVERY DAY

## 2024-03-08 NOTE — ASSESSMENT & PLAN NOTE
Stable, continue current medications and management.  The risks and benefits of my recommendations, as well as other treatment options were discussed with the patient today.    The side effects of the medications were discussed.  Advised not to take the medication with alcohol .  Exercise regularly and this can give you a sense of well being and help decrease feelings of anxiety.;  Get plenty of sleep. Sleep rests your brain as well as your body, and can improve your general sense of wellbeing as well as your mood.;  Avoid alcohol and drug abuse. It may seem that alcohol or drugs relax you. But in the long run they make anxiety worse and cause more problems.;  Avoid caffeine. Caffeine is found in coffee, tea, soft drinks and chocolate. Caffeine may increase your sense of anxiety because it stimulates your nervous system. Also avoid over-the-counter diet pills, and cough and cold medicines that contain a decongestant.;  Confront the things that have made you anxious in the past. Begin by just picturing yourself confronting these things. By doing this, you can get used to the idea of confronting the things that make you anxious before you actually do it. After you feel more comfortable picturing yourself confronting these things, you can begin to actually face them.;  If you feel yourself getting anxious, practice a relaxation technique or focus on a simple task, such as counting backward from 100 to 0.;  Although feelings of anxiety are scary, they won't hurt you. Label the level of your fear from 0 to 10 and keep track as it goes up and down. Notice that it doesn't stay at a very high level for more than a few seconds. When the fear comes, accept it. Wait and give it time to pass without running away from it.;  Take medications as prescribed.

## 2024-03-08 NOTE — PROGRESS NOTES
"Subjective   Patient ID: Edilma Benitez is a 43 y.o. female who presents for Annual Exam and Follow-up (Depression and Anxiety).    Patient presents today for annual physical exam.    Depression  Visit Type: follow-up  Patient presents with the following symptoms: decreased concentration, depressed mood, excessive worry, feelings of hopelessness, feelings of worthlessness, irritability and nervousness/anxiety.  Patient is not experiencing: palpitations, shortness of breath, suicidal ideas, suicidal planning and thoughts of death.  Frequency of symptoms: constantly   Severity: moderate   Sleep per night: 5 hours  Sleep quality: fair  Nighttime awakenings: several       Review of Systems   Constitutional:  Positive for irritability. Negative for chills and fever.   HENT:  Negative for congestion, ear pain, nosebleeds, rhinorrhea and sore throat.    Respiratory:  Negative for cough, shortness of breath and wheezing.    Cardiovascular:  Negative for chest pain, palpitations and leg swelling.   Gastrointestinal:  Negative for abdominal pain, constipation, diarrhea and vomiting.   Genitourinary:  Negative for dysuria, frequency and hematuria.   Neurological:  Negative for dizziness, tremors, numbness and headaches.   Psychiatric/Behavioral:  Positive for decreased concentration and depression. Negative for suicidal ideas. The patient is nervous/anxious.      Objective   /68 (BP Location: Left arm, Patient Position: Sitting)   Pulse 71   Temp 36.5 °C (97.7 °F)   Ht 1.575 m (5' 2\")   Wt 57.1 kg (125 lb 12.8 oz)   SpO2 98%   BMI 23.01 kg/m²     Physical Exam  Constitutional:       General: She is not in acute distress.     Appearance: Normal appearance.   HENT:      Head: Normocephalic and atraumatic.      Mouth/Throat:      Mouth: Mucous membranes are moist.      Pharynx: Oropharynx is clear. No oropharyngeal exudate or posterior oropharyngeal erythema.   Eyes:      General: No scleral icterus.     Extraocular " Movements: Extraocular movements intact.      Pupils: Pupils are equal, round, and reactive to light.   Cardiovascular:      Rate and Rhythm: Normal rate and regular rhythm.      Pulses: Normal pulses.      Heart sounds: No murmur heard.     No friction rub. No gallop.   Pulmonary:      Effort: Pulmonary effort is normal.      Breath sounds: No wheezing, rhonchi or rales.   Skin:     General: Skin is warm.      Coloration: Skin is not jaundiced or pale.      Findings: No erythema or rash.   Neurological:      General: No focal deficit present.      Mental Status: She is alert and oriented to person, place, and time.      Cranial Nerves: No cranial nerve deficit.      Sensory: No sensory deficit.      Coordination: Coordination normal.      Gait: Gait normal.         Assessment/Plan   Problem List Items Addressed This Visit       Generalized anxiety disorder     Stable, continue current medications and management.  The risks and benefits of my recommendations, as well as other treatment options were discussed with the patient today.    The side effects of the medications were discussed.  Advised not to take the medication with alcohol .  Exercise regularly and this can give you a sense of well being and help decrease feelings of anxiety.;  Get plenty of sleep. Sleep rests your brain as well as your body, and can improve your general sense of wellbeing as well as your mood.;  Avoid alcohol and drug abuse. It may seem that alcohol or drugs relax you. But in the long run they make anxiety worse and cause more problems.;  Avoid caffeine. Caffeine is found in coffee, tea, soft drinks and chocolate. Caffeine may increase your sense of anxiety because it stimulates your nervous system. Also avoid over-the-counter diet pills, and cough and cold medicines that contain a decongestant.;  Confront the things that have made you anxious in the past. Begin by just picturing yourself confronting these things. By doing this, you can  get used to the idea of confronting the things that make you anxious before you actually do it. After you feel more comfortable picturing yourself confronting these things, you can begin to actually face them.;  If you feel yourself getting anxious, practice a relaxation technique or focus on a simple task, such as counting backward from 100 to 0.;  Although feelings of anxiety are scary, they won't hurt you. Label the level of your fear from 0 to 10 and keep track as it goes up and down. Notice that it doesn't stay at a very high level for more than a few seconds. When the fear comes, accept it. Wait and give it time to pass without running away from it.;  Take medications as prescribed.           Relevant Medications    FLUoxetine (PROzac) 40 mg capsule    Other Relevant Orders    Follow Up In [facility] - PCP - Established    Healthcare maintenance - Primary     Recommend low-cholesterol diet, low-fat diet and low-salt diet.  The need for lifelong dietary compliance in order to reduce cardiac risk is recommended.  We will also recommend regular exercise program to improve lipid balance and overall health.  Recommend decreasing fat and cholesterol in diet, increasing aerobic exercise with a goal of 4 or more days per week         Relevant Orders    CBC and Auto Differential    Comprehensive Metabolic Panel    Lipid Panel    Moderate episode of recurrent major depressive disorder (CMS/HCC)     Stable, continue current medications and management.  Reviewed concept of depression as biochemical imbalance of neurotransmitters and rationale for treatment. Instructed patient to contact office or on-call physician promptly should condition worsen or any new symptoms appear.         Relevant Medications    FLUoxetine (PROzac) 40 mg capsule    Other Relevant Orders    Follow Up In [facility] - PCP - Established     Scribe Attestation  By signing my name below, ISergio Scribe   attest that  this documentation has been prepared under the direction and in the presence of Henry Wallace MD.

## 2024-03-09 ASSESSMENT — ENCOUNTER SYMPTOMS
COUGH: 0
VOMITING: 0
SHORTNESS OF BREATH: 0
RHINORRHEA: 0
FEVER: 0
HEADACHES: 0
ABDOMINAL PAIN: 0
DYSURIA: 0
DIARRHEA: 0
HEMATURIA: 0
CONSTIPATION: 0
DIZZINESS: 0
SORE THROAT: 0
TREMORS: 0
PALPITATIONS: 0
FREQUENCY: 0
WHEEZING: 0
CHILLS: 0
NUMBNESS: 0

## 2024-07-22 ENCOUNTER — TELEPHONE (OUTPATIENT)
Dept: GASTROENTEROLOGY | Facility: CLINIC | Age: 44
End: 2024-07-22
Payer: COMMERCIAL

## 2024-07-22 NOTE — TELEPHONE ENCOUNTER
I left message to call the office to reschedule appointment; cancelled 6 month follow up visit via [employer].

## 2024-07-22 NOTE — TELEPHONE ENCOUNTER
Patient returned call and said PCP referred her to another provider.  Appointment no longer needed.

## 2024-07-23 ENCOUNTER — APPOINTMENT (OUTPATIENT)
Dept: GASTROENTEROLOGY | Facility: CLINIC | Age: 44
End: 2024-07-23
Payer: COMMERCIAL

## 2024-08-30 ENCOUNTER — LAB (OUTPATIENT)
Dept: LAB | Facility: LAB | Age: 44
End: 2024-08-30
Payer: COMMERCIAL

## 2024-08-30 DIAGNOSIS — Z00.00 HEALTHCARE MAINTENANCE: ICD-10-CM

## 2024-08-30 LAB
ALBUMIN SERPL BCP-MCNC: 4.1 G/DL (ref 3.4–5)
ALP SERPL-CCNC: 66 U/L (ref 33–110)
ALT SERPL W P-5'-P-CCNC: 11 U/L (ref 7–45)
ANION GAP SERPL CALC-SCNC: 11 MMOL/L (ref 10–20)
AST SERPL W P-5'-P-CCNC: 11 U/L (ref 9–39)
BASOPHILS # BLD AUTO: 0.04 X10*3/UL (ref 0–0.1)
BASOPHILS NFR BLD AUTO: 0.3 %
BILIRUB SERPL-MCNC: 1 MG/DL (ref 0–1.2)
BUN SERPL-MCNC: 10 MG/DL (ref 6–23)
CALCIUM SERPL-MCNC: 9.1 MG/DL (ref 8.6–10.3)
CHLORIDE SERPL-SCNC: 107 MMOL/L (ref 98–107)
CHOLEST SERPL-MCNC: 185 MG/DL (ref 0–199)
CHOLESTEROL/HDL RATIO: 4.2
CO2 SERPL-SCNC: 24 MMOL/L (ref 21–32)
CREAT SERPL-MCNC: 0.71 MG/DL (ref 0.5–1.05)
EGFRCR SERPLBLD CKD-EPI 2021: >90 ML/MIN/1.73M*2
EOSINOPHIL # BLD AUTO: 0.35 X10*3/UL (ref 0–0.7)
EOSINOPHIL NFR BLD AUTO: 2.4 %
ERYTHROCYTE [DISTWIDTH] IN BLOOD BY AUTOMATED COUNT: 13.1 % (ref 11.5–14.5)
GLUCOSE SERPL-MCNC: 93 MG/DL (ref 74–99)
HCT VFR BLD AUTO: 42 % (ref 36–46)
HDLC SERPL-MCNC: 44.5 MG/DL
HGB BLD-MCNC: 13.9 G/DL (ref 12–16)
IMM GRANULOCYTES # BLD AUTO: 0.1 X10*3/UL (ref 0–0.7)
IMM GRANULOCYTES NFR BLD AUTO: 0.7 % (ref 0–0.9)
LDLC SERPL CALC-MCNC: 119 MG/DL
LYMPHOCYTES # BLD AUTO: 1.94 X10*3/UL (ref 1.2–4.8)
LYMPHOCYTES NFR BLD AUTO: 13.4 %
MCH RBC QN AUTO: 29.8 PG (ref 26–34)
MCHC RBC AUTO-ENTMCNC: 33.1 G/DL (ref 32–36)
MCV RBC AUTO: 90 FL (ref 80–100)
MONOCYTES # BLD AUTO: 0.79 X10*3/UL (ref 0.1–1)
MONOCYTES NFR BLD AUTO: 5.5 %
NEUTROPHILS # BLD AUTO: 11.21 X10*3/UL (ref 1.2–7.7)
NEUTROPHILS NFR BLD AUTO: 77.7 %
NON HDL CHOLESTEROL: 141 MG/DL (ref 0–149)
NRBC BLD-RTO: 0 /100 WBCS (ref 0–0)
PLATELET # BLD AUTO: 343 X10*3/UL (ref 150–450)
POTASSIUM SERPL-SCNC: 3.9 MMOL/L (ref 3.5–5.3)
PROT SERPL-MCNC: 6.5 G/DL (ref 6.4–8.2)
RBC # BLD AUTO: 4.67 X10*6/UL (ref 4–5.2)
SODIUM SERPL-SCNC: 138 MMOL/L (ref 136–145)
TRIGL SERPL-MCNC: 109 MG/DL (ref 0–149)
VLDL: 22 MG/DL (ref 0–40)
WBC # BLD AUTO: 14.4 X10*3/UL (ref 4.4–11.3)

## 2024-08-30 PROCEDURE — 36415 COLL VENOUS BLD VENIPUNCTURE: CPT

## 2024-08-30 PROCEDURE — 85025 COMPLETE CBC W/AUTO DIFF WBC: CPT

## 2024-08-30 PROCEDURE — 80061 LIPID PANEL: CPT

## 2024-08-30 PROCEDURE — 80053 COMPREHEN METABOLIC PANEL: CPT

## 2024-09-10 ENCOUNTER — APPOINTMENT (OUTPATIENT)
Dept: PRIMARY CARE | Facility: CLINIC | Age: 44
End: 2024-09-10
Payer: COMMERCIAL

## 2024-09-10 VITALS
SYSTOLIC BLOOD PRESSURE: 100 MMHG | TEMPERATURE: 97.8 F | WEIGHT: 134.2 LBS | DIASTOLIC BLOOD PRESSURE: 62 MMHG | OXYGEN SATURATION: 98 % | HEIGHT: 62 IN | HEART RATE: 85 BPM | BODY MASS INDEX: 24.69 KG/M2 | RESPIRATION RATE: 16 BRPM

## 2024-09-10 DIAGNOSIS — D72.829 LEUKOCYTOSIS, UNSPECIFIED TYPE: ICD-10-CM

## 2024-09-10 DIAGNOSIS — F33.1 MODERATE EPISODE OF RECURRENT MAJOR DEPRESSIVE DISORDER (MULTI): ICD-10-CM

## 2024-09-10 DIAGNOSIS — F41.1 GENERALIZED ANXIETY DISORDER: ICD-10-CM

## 2024-09-10 PROCEDURE — 99213 OFFICE O/P EST LOW 20 MIN: CPT | Performed by: FAMILY MEDICINE

## 2024-09-10 PROCEDURE — 3008F BODY MASS INDEX DOCD: CPT | Performed by: FAMILY MEDICINE

## 2024-09-10 PROCEDURE — 4004F PT TOBACCO SCREEN RCVD TLK: CPT | Performed by: FAMILY MEDICINE

## 2024-09-10 RX ORDER — FLUOXETINE HYDROCHLORIDE 40 MG/1
40 CAPSULE ORAL DAILY
Qty: 90 CAPSULE | Refills: 1 | Status: SHIPPED | OUTPATIENT
Start: 2024-09-10

## 2024-09-10 ASSESSMENT — ENCOUNTER SYMPTOMS
CHILLS: 0
FEVER: 0
TREMORS: 0
DIZZINESS: 0
DEPRESSED MOOD: 1
CONSTIPATION: 0
VOMITING: 0
IRRITABILITY: 0
DYSURIA: 0
RHINORRHEA: 0
PALPITATIONS: 0
FREQUENCY: 0
SORE THROAT: 0
PANIC: 0
COUGH: 0
SHORTNESS OF BREATH: 0
NUMBNESS: 0
ABDOMINAL PAIN: 0
HEMATURIA: 0
NERVOUS/ANXIOUS: 1
WHEEZING: 0
RESTLESSNESS: 0
HEADACHES: 0
NAUSEA: 0
DIARRHEA: 0

## 2024-09-10 ASSESSMENT — ANXIETY QUESTIONNAIRES
6. BECOMING EASILY ANNOYED OR IRRITABLE: NOT AT ALL
IF YOU CHECKED OFF ANY PROBLEMS ON THIS QUESTIONNAIRE, HOW DIFFICULT HAVE THESE PROBLEMS MADE IT FOR YOU TO DO YOUR WORK, TAKE CARE OF THINGS AT HOME, OR GET ALONG WITH OTHER PEOPLE: NOT DIFFICULT AT ALL
3. WORRYING TOO MUCH ABOUT DIFFERENT THINGS: NOT AT ALL
1. FEELING NERVOUS, ANXIOUS, OR ON EDGE: NOT AT ALL
5. BEING SO RESTLESS THAT IT IS HARD TO SIT STILL: NOT AT ALL
GAD7 TOTAL SCORE: 0
4. TROUBLE RELAXING: NOT AT ALL
2. NOT BEING ABLE TO STOP OR CONTROL WORRYING: NOT AT ALL
7. FEELING AFRAID AS IF SOMETHING AWFUL MIGHT HAPPEN: NOT AT ALL

## 2024-09-10 ASSESSMENT — PATIENT HEALTH QUESTIONNAIRE - PHQ9
SUM OF ALL RESPONSES TO PHQ9 QUESTIONS 1 AND 2: 0
1. LITTLE INTEREST OR PLEASURE IN DOING THINGS: NOT AT ALL
2. FEELING DOWN, DEPRESSED OR HOPELESS: NOT AT ALL

## 2024-09-10 NOTE — PROGRESS NOTES
"Subjective   Patient ID: Edilma Benitez is a 44 y.o. female who presents for Follow-up (Anxiety,Depression).    Anxiety  Presents for follow-up visit. Symptoms include depressed mood and nervous/anxious behavior. Patient reports no chest pain, dizziness, excessive worry, irritability, nausea, palpitations, panic, restlessness, shortness of breath or suicidal ideas. Symptoms occur occasionally. The severity of symptoms is mild. The patient sleeps 5 hours per night. The quality of sleep is fair. Nighttime awakenings: several.     Compliance with medications is %.      Patient did start seeing a therapist with her .     Review of Systems   Constitutional:  Negative for chills, fever and irritability.   HENT:  Negative for congestion, ear pain, nosebleeds, rhinorrhea and sore throat.    Respiratory:  Negative for cough, shortness of breath and wheezing.    Cardiovascular:  Negative for chest pain, palpitations and leg swelling.   Gastrointestinal:  Negative for abdominal pain, constipation, diarrhea, nausea and vomiting.   Genitourinary:  Negative for dysuria, frequency and hematuria.   Neurological:  Negative for dizziness, tremors, numbness and headaches.   Psychiatric/Behavioral:  Negative for suicidal ideas. The patient is nervous/anxious.        Objective   /62 (BP Location: Left arm, Patient Position: Sitting)   Pulse 85   Temp 36.6 °C (97.8 °F)   Resp 16   Ht 1.575 m (5' 2\")   Wt 60.9 kg (134 lb 3.2 oz)   SpO2 98%   BMI 24.55 kg/m²     Physical Exam  Constitutional:       General: She is not in acute distress.     Appearance: Normal appearance.   HENT:      Head: Normocephalic and atraumatic.      Mouth/Throat:      Mouth: Mucous membranes are moist.      Pharynx: Oropharynx is clear. No oropharyngeal exudate or posterior oropharyngeal erythema.   Eyes:      General: No scleral icterus.     Extraocular Movements: Extraocular movements intact.      Pupils: Pupils are equal, round, and " reactive to light.   Cardiovascular:      Rate and Rhythm: Normal rate and regular rhythm.      Pulses: Normal pulses.      Heart sounds: No murmur heard.     No friction rub. No gallop.   Pulmonary:      Effort: Pulmonary effort is normal.      Breath sounds: No wheezing, rhonchi or rales.   Skin:     General: Skin is warm.      Coloration: Skin is not jaundiced or pale.      Findings: No erythema or rash.   Neurological:      General: No focal deficit present.      Mental Status: She is alert and oriented to person, place, and time.      Cranial Nerves: No cranial nerve deficit.      Sensory: No sensory deficit.      Coordination: Coordination normal.      Gait: Gait normal.         Lab on 08/30/2024   Component Date Value Ref Range Status    WBC 08/30/2024 14.4 (H)  4.4 - 11.3 x10*3/uL Final    nRBC 08/30/2024 0.0  0.0 - 0.0 /100 WBCs Final    RBC 08/30/2024 4.67  4.00 - 5.20 x10*6/uL Final    Hemoglobin 08/30/2024 13.9  12.0 - 16.0 g/dL Final    Hematocrit 08/30/2024 42.0  36.0 - 46.0 % Final    MCV 08/30/2024 90  80 - 100 fL Final    MCH 08/30/2024 29.8  26.0 - 34.0 pg Final    MCHC 08/30/2024 33.1  32.0 - 36.0 g/dL Final    RDW 08/30/2024 13.1  11.5 - 14.5 % Final    Platelets 08/30/2024 343  150 - 450 x10*3/uL Final    Neutrophils % 08/30/2024 77.7  40.0 - 80.0 % Final    Immature Granulocytes %, Automated 08/30/2024 0.7  0.0 - 0.9 % Final    Immature Granulocyte Count (IG) includes promyelocytes, myelocytes and metamyelocytes but does not include bands. Percent differential counts (%) should be interpreted in the context of the absolute cell counts (cells/UL).    Lymphocytes % 08/30/2024 13.4  13.0 - 44.0 % Final    Monocytes % 08/30/2024 5.5  2.0 - 10.0 % Final    Eosinophils % 08/30/2024 2.4  0.0 - 6.0 % Final    Basophils % 08/30/2024 0.3  0.0 - 2.0 % Final    Neutrophils Absolute 08/30/2024 11.21 (H)  1.20 - 7.70 x10*3/uL Final    Percent differential counts (%) should be interpreted in the context of  the absolute cell counts (cells/uL).    Immature Granulocytes Absolute, Au* 08/30/2024 0.10  0.00 - 0.70 x10*3/uL Final    Lymphocytes Absolute 08/30/2024 1.94  1.20 - 4.80 x10*3/uL Final    Monocytes Absolute 08/30/2024 0.79  0.10 - 1.00 x10*3/uL Final    Eosinophils Absolute 08/30/2024 0.35  0.00 - 0.70 x10*3/uL Final    Basophils Absolute 08/30/2024 0.04  0.00 - 0.10 x10*3/uL Final    Glucose 08/30/2024 93  74 - 99 mg/dL Final    Sodium 08/30/2024 138  136 - 145 mmol/L Final    Potassium 08/30/2024 3.9  3.5 - 5.3 mmol/L Final    Chloride 08/30/2024 107  98 - 107 mmol/L Final    Bicarbonate 08/30/2024 24  21 - 32 mmol/L Final    Anion Gap 08/30/2024 11  10 - 20 mmol/L Final    Urea Nitrogen 08/30/2024 10  6 - 23 mg/dL Final    Creatinine 08/30/2024 0.71  0.50 - 1.05 mg/dL Final    eGFR 08/30/2024 >90  >60 mL/min/1.73m*2 Final    Calculations of estimated GFR are performed using the 2021 CKD-EPI Study Refit equation without the race variable for the IDMS-Traceable creatinine methods.  https://jasn.asnjournals.org/content/early/2021/09/22/ASN.7386398553    Calcium 08/30/2024 9.1  8.6 - 10.3 mg/dL Final    Albumin 08/30/2024 4.1  3.4 - 5.0 g/dL Final    Alkaline Phosphatase 08/30/2024 66  33 - 110 U/L Final    Total Protein 08/30/2024 6.5  6.4 - 8.2 g/dL Final    AST 08/30/2024 11  9 - 39 U/L Final    Bilirubin, Total 08/30/2024 1.0  0.0 - 1.2 mg/dL Final    ALT 08/30/2024 11  7 - 45 U/L Final    Patients treated with Sulfasalazine may generate falsely decreased results for ALT.    Cholesterol 08/30/2024 185  0 - 199 mg/dL Final          Age      Desirable   Borderline High   High     0-19 Y     0 - 169       170 - 199     >/= 200    20-24 Y     0 - 189       190 - 224     >/= 225         >24 Y     0 - 199       200 - 239     >/= 240   **All ranges are based on fasting samples. Specific   therapeutic targets will vary based on patient-specific   cardiac risk.    Pediatric guidelines reference:Pediatrics 2011,  128(S5).Adult guidelines reference: NCEP ATPIII Guidelines,BUDDY 2001, 258:2486-97    Venipuncture immediately after or during the administration of Metamizole may lead to falsely low results. Testing should be performed immediately prior to Metamizole dosing.    HDL-Cholesterol 08/30/2024 44.5  mg/dL Final      Age       Very Low   Low     Normal    High    0-19 Y    < 35      < 40     40-45     ----  20-24 Y    ----     < 40      >45      ----        >24 Y      ----     < 40     40-60      >60      Cholesterol/HDL Ratio 08/30/2024 4.2   Final      Ref Values  Desirable  < 3.4  High Risk  > 5.0    LDL Calculated 08/30/2024 119 (H)  <=99 mg/dL Final                                Near   Borderline      AGE      Desirable  Optimal    High     High     Very High     0-19 Y     0 - 109     ---    110-129   >/= 130     ----    20-24 Y     0 - 119     ---    120-159   >/= 160     ----      >24 Y     0 -  99   100-129  130-159   160-189     >/=190      VLDL 08/30/2024 22  0 - 40 mg/dL Final    Triglycerides 08/30/2024 109  0 - 149 mg/dL Final       Age         Desirable   Borderline High   High     Very High   0 D-90 D    19 - 174         ----         ----        ----  91 D- 9 Y     0 -  74        75 -  99     >/= 100      ----    10-19 Y     0 -  89        90 - 129     >/= 130      ----    20-24 Y     0 - 114       115 - 149     >/= 150      ----         >24 Y     0 - 149       150 - 199    200- 499    >/= 500    Venipuncture immediately after or during the administration of Metamizole may lead to falsely low results. Testing should be performed immediately prior to Metamizole dosing.    Non HDL Cholesterol 08/30/2024 141  0 - 149 mg/dL Final          Age       Desirable   Borderline High   High     Very High     0-19 Y     0 - 119       120 - 144     >/= 145    >/= 160    20-24 Y     0 - 149       150 - 189     >/= 190      ----         >24 Y    30 mg/dL above LDL Cholesterol goal         Assessment/Plan   Problem List  Items Addressed This Visit       Generalized anxiety disorder     Stable based on symptoms and exam. Continue established treatment plan.  The risks and benefits of my recommendations, as well as other treatment options were discussed with the patient today.    The side effects of the medications were discussed.  Advised not to take the medication with alcohol .  Exercise regularly and this can give you a sense of well being and help decrease feelings of anxiety.;  Get plenty of sleep. Sleep rests your brain as well as your body, and can improve your general sense of wellbeing as well as your mood.;  Avoid alcohol and drug abuse. It may seem that alcohol or drugs relax you. But in the long run they make anxiety worse and cause more problems.;  Avoid caffeine. Caffeine is found in coffee, tea, soft drinks and chocolate. Caffeine may increase your sense of anxiety because it stimulates your nervous system. Also avoid over-the-counter diet pills, and cough and cold medicines that contain a decongestant.;  Confront the things that have made you anxious in the past. Begin by just picturing yourself confronting these things. By doing this, you can get used to the idea of confronting the things that make you anxious before you actually do it. After you feel more comfortable picturing yourself confronting these things, you can begin to actually face them.;  If you feel yourself getting anxious, practice a relaxation technique or focus on a simple task, such as counting backward from 100 to 0.;  Although feelings of anxiety are scary, they won't hurt you. Label the level of your fear from 0 to 10 and keep track as it goes up and down. Notice that it doesn't stay at a very high level for more than a few seconds. When the fear comes, accept it. Wait and give it time to pass without running away from it.;  Take medications as prescribed.          Relevant Medications    FLUoxetine (PROzac) 40 mg capsule    Other Relevant Orders     Follow Up In [facility] - PCP    Leukocytosis     Order placed for CBC for further evaluation in 6 months.            Relevant Orders    CBC and Auto Differential    Moderate episode of recurrent major depressive disorder (Multi)     Stable based on symptoms and exam. Continue established treatment plan.             Relevant Medications    FLUoxetine (PROzac) 40 mg capsule    Other Relevant Orders    Follow Up In [facility] - PCP          Scribe Attestation  By signing my name below, I, Katia Steel, Scribe   attest that this documentation has been prepared under the direction and in the presence of Henry Wallace MD .

## 2024-09-10 NOTE — ASSESSMENT & PLAN NOTE
Stable based on symptoms and exam. Continue established treatment plan.  The risks and benefits of my recommendations, as well as other treatment options were discussed with the patient today.    The side effects of the medications were discussed.  Advised not to take the medication with alcohol .  Exercise regularly and this can give you a sense of well being and help decrease feelings of anxiety.;  Get plenty of sleep. Sleep rests your brain as well as your body, and can improve your general sense of wellbeing as well as your mood.;  Avoid alcohol and drug abuse. It may seem that alcohol or drugs relax you. But in the long run they make anxiety worse and cause more problems.;  Avoid caffeine. Caffeine is found in coffee, tea, soft drinks and chocolate. Caffeine may increase your sense of anxiety because it stimulates your nervous system. Also avoid over-the-counter diet pills, and cough and cold medicines that contain a decongestant.;  Confront the things that have made you anxious in the past. Begin by just picturing yourself confronting these things. By doing this, you can get used to the idea of confronting the things that make you anxious before you actually do it. After you feel more comfortable picturing yourself confronting these things, you can begin to actually face them.;  If you feel yourself getting anxious, practice a relaxation technique or focus on a simple task, such as counting backward from 100 to 0.;  Although feelings of anxiety are scary, they won't hurt you. Label the level of your fear from 0 to 10 and keep track as it goes up and down. Notice that it doesn't stay at a very high level for more than a few seconds. When the fear comes, accept it. Wait and give it time to pass without running away from it.;  Take medications as prescribed.

## 2025-03-07 ENCOUNTER — APPOINTMENT (OUTPATIENT)
Dept: PRIMARY CARE | Facility: CLINIC | Age: 45
End: 2025-03-07
Payer: COMMERCIAL

## 2025-03-07 VITALS
DIASTOLIC BLOOD PRESSURE: 64 MMHG | BODY MASS INDEX: 24.84 KG/M2 | HEIGHT: 62 IN | RESPIRATION RATE: 14 BRPM | HEART RATE: 74 BPM | WEIGHT: 135 LBS | SYSTOLIC BLOOD PRESSURE: 110 MMHG | OXYGEN SATURATION: 98 % | TEMPERATURE: 97.5 F

## 2025-03-07 DIAGNOSIS — Z12.31 ENCOUNTER FOR SCREENING MAMMOGRAM FOR BREAST CANCER: ICD-10-CM

## 2025-03-07 DIAGNOSIS — D72.829 LEUKOCYTOSIS, UNSPECIFIED TYPE: ICD-10-CM

## 2025-03-07 DIAGNOSIS — F41.1 GENERALIZED ANXIETY DISORDER: ICD-10-CM

## 2025-03-07 DIAGNOSIS — Z00.00 HEALTHCARE MAINTENANCE: Primary | ICD-10-CM

## 2025-03-07 DIAGNOSIS — Z01.419 ENCOUNTER FOR ANNUAL ROUTINE GYNECOLOGICAL EXAMINATION: ICD-10-CM

## 2025-03-07 DIAGNOSIS — F33.1 MODERATE EPISODE OF RECURRENT MAJOR DEPRESSIVE DISORDER: ICD-10-CM

## 2025-03-07 DIAGNOSIS — E55.9 VITAMIN D DEFICIENCY: ICD-10-CM

## 2025-03-07 DIAGNOSIS — L72.0 EPIDERMOID CYST: ICD-10-CM

## 2025-03-07 PROCEDURE — 99396 PREV VISIT EST AGE 40-64: CPT | Performed by: FAMILY MEDICINE

## 2025-03-07 PROCEDURE — 3008F BODY MASS INDEX DOCD: CPT | Performed by: FAMILY MEDICINE

## 2025-03-07 RX ORDER — FLUOXETINE HYDROCHLORIDE 40 MG/1
40 CAPSULE ORAL DAILY
Qty: 90 CAPSULE | Refills: 1 | Status: SHIPPED | OUTPATIENT
Start: 2025-03-07

## 2025-03-07 ASSESSMENT — ENCOUNTER SYMPTOMS
HYPERVENTILATION: 0
IRRITABILITY: 1
FEELING OF CHOKING: 0
NAUSEA: 0
PANIC: 0
MALAISE: 0
MUSCLE TENSION: 0
THOUGHT CONTENT - OBSESSIONS: 0
NERVOUS/ANXIOUS: 1
INSOMNIA: 1
DECREASED CONCENTRATION: 1
DEPRESSED MOOD: 0
RESTLESSNESS: 1
PALPITATIONS: 0
DIZZINESS: 0
SHORTNESS OF BREATH: 0
COMPULSIONS: 0

## 2025-03-07 ASSESSMENT — ANXIETY QUESTIONNAIRES
1. FEELING NERVOUS, ANXIOUS, OR ON EDGE: SEVERAL DAYS
GAD7 TOTAL SCORE: 16
2. NOT BEING ABLE TO STOP OR CONTROL WORRYING: NEARLY EVERY DAY
7. FEELING AFRAID AS IF SOMETHING AWFUL MIGHT HAPPEN: NOT AT ALL
IF YOU CHECKED OFF ANY PROBLEMS ON THIS QUESTIONNAIRE, HOW DIFFICULT HAVE THESE PROBLEMS MADE IT FOR YOU TO DO YOUR WORK, TAKE CARE OF THINGS AT HOME, OR GET ALONG WITH OTHER PEOPLE: NOT DIFFICULT AT ALL
4. TROUBLE RELAXING: NEARLY EVERY DAY
6. BECOMING EASILY ANNOYED OR IRRITABLE: NEARLY EVERY DAY
3. WORRYING TOO MUCH ABOUT DIFFERENT THINGS: NEARLY EVERY DAY
5. BEING SO RESTLESS THAT IT IS HARD TO SIT STILL: NEARLY EVERY DAY

## 2025-03-07 NOTE — PROGRESS NOTES
"Subjective   Patient ID: Edilma Benitez is a 44 y.o. female who presents for Annual Exam, Follow-up (Anxiety, and Depression), and Mass.    Patient presents today for annual physical exam.    She complains of a lump on the back of her neck.    Anxiety  Presents for follow-up visit. Symptoms include decreased concentration, insomnia, irritability, nervous/anxious behavior and restlessness. Patient reports no chest pain, compulsions, confusion, depressed mood, dizziness, dry mouth, excessive worry, feeling of choking, hyperventilation, impotence, malaise, muscle tension, nausea, obsessions, palpitations, panic, shortness of breath or suicidal ideas. Symptoms occur occasionally. The most recent episode lasted 2 hours. The severity of symptoms is mild. The patient sleeps 5 hours per night. The quality of sleep is poor. Nighttime awakenings: several.     Compliance with medications is %.        Review of Systems   Constitutional:  Positive for irritability. Negative for chills and fever.   HENT:  Negative for congestion, ear pain, nosebleeds, rhinorrhea and sore throat.    Respiratory:  Negative for cough, shortness of breath and wheezing.    Cardiovascular:  Negative for chest pain, palpitations and leg swelling.   Gastrointestinal:  Negative for abdominal pain, constipation, diarrhea, nausea and vomiting.   Endocrine: Negative for cold intolerance, heat intolerance, polydipsia, polyphagia and polyuria.   Genitourinary:  Negative for dysuria, frequency, hematuria and impotence.   Neurological:  Negative for dizziness, tremors, numbness and headaches.   Psychiatric/Behavioral:  Positive for decreased concentration. Negative for confusion and suicidal ideas. The patient is nervous/anxious and has insomnia.        Objective   /64   Pulse 74   Temp 36.4 °C (97.5 °F)   Resp 14   Ht 1.575 m (5' 2\")   Wt 61.2 kg (135 lb)   SpO2 98%   BMI 24.69 kg/m²     Physical Exam  Constitutional:       General: She is " not in acute distress.     Appearance: Normal appearance.   HENT:      Head: Normocephalic and atraumatic.      Mouth/Throat:      Mouth: Mucous membranes are moist.      Pharynx: Oropharynx is clear. No oropharyngeal exudate or posterior oropharyngeal erythema.   Eyes:      General: No scleral icterus.     Extraocular Movements: Extraocular movements intact.      Pupils: Pupils are equal, round, and reactive to light.   Cardiovascular:      Rate and Rhythm: Normal rate and regular rhythm.      Pulses: Normal pulses.      Heart sounds: No murmur heard.     No friction rub. No gallop.   Pulmonary:      Effort: Pulmonary effort is normal.      Breath sounds: No wheezing, rhonchi or rales.   Skin:     General: Skin is warm.      Coloration: Skin is not jaundiced or pale.      Findings: No erythema or rash.   Neurological:      General: No focal deficit present.      Mental Status: She is alert and oriented to person, place, and time.      Cranial Nerves: No cranial nerve deficit.      Sensory: No sensory deficit.      Coordination: Coordination normal.      Gait: Gait normal.         Assessment/Plan   Problem List Items Addressed This Visit       Epidermoid cyst     Follow-up if persistent or worsening symptoms otherwise when necessary.         Generalized anxiety disorder     Stable, continue current medications and management.  The risks and benefits of my recommendations, as well as other treatment options were discussed with the patient today.    The side effects of the medications were discussed.  Advised not to take the medication with alcohol .  Exercise regularly and this can give you a sense of well being and help decrease feelings of anxiety.;  Get plenty of sleep. Sleep rests your brain as well as your body, and can improve your general sense of wellbeing as well as your mood.;  Avoid alcohol and drug abuse. It may seem that alcohol or drugs relax you. But in the long run they make anxiety worse and cause  more problems.;  Avoid caffeine. Caffeine is found in coffee, tea, soft drinks and chocolate. Caffeine may increase your sense of anxiety because it stimulates your nervous system. Also avoid over-the-counter diet pills, and cough and cold medicines that contain a decongestant.;  Confront the things that have made you anxious in the past. Begin by just picturing yourself confronting these things. By doing this, you can get used to the idea of confronting the things that make you anxious before you actually do it. After you feel more comfortable picturing yourself confronting these things, you can begin to actually face them.;  If you feel yourself getting anxious, practice a relaxation technique or focus on a simple task, such as counting backward from 100 to 0.;  Although feelings of anxiety are scary, they won't hurt you. Label the level of your fear from 0 to 10 and keep track as it goes up and down. Notice that it doesn't stay at a very high level for more than a few seconds. When the fear comes, accept it. Wait and give it time to pass without running away from it.;  Take medications as prescribed.         Relevant Medications    FLUoxetine (PROzac) 40 mg capsule    Other Relevant Orders    Follow Up In [facility] - PCP - Established    Healthcare maintenance - Primary     Recommend low-cholesterol diet, low-fat diet and low-salt diet.  The need for lifelong dietary compliance in order to reduce cardiac risk is recommended.  We will also recommend regular exercise program to improve lipid balance and overall health.  Recommend decreasing fat and cholesterol in diet, increasing aerobic exercise with a goal of 4 or more days per week         Leukocytosis     We will order labs for further evaluation.         Relevant Orders    CBC and Auto Differential    Vitamin B12    Folate    Moderate episode of recurrent major depressive disorder     Stable, continue current medications and management.  Reviewed  concept of depression as biochemical imbalance of neurotransmitters and rationale for treatment. Instructed patient to contact office or on-call physician promptly should condition worsen or any new symptoms appear          Relevant Medications    FLUoxetine (PROzac) 40 mg capsule    Other Relevant Orders    Follow Up [facility] - PCP - Established    Vitamin D deficiency     We will order labs for further evaluation.         Relevant Orders    Vitamin D 25-Hydroxy,Total (for eval of Vitamin D levels)     Other Visit Diagnoses       Encounter for screening mammogram for breast cancer        Relevant Orders    BI mammo bilateral screening tomosynthesis    Encounter for annual routine gynecological examination        Relevant Orders    Referral to Gynecology          Scribe Attestation  By signing my name below, I, Kilo Concepcion   attest that this documentation has been prepared under the direction and in the presence of Henry Wallace MD.

## 2025-03-08 LAB
25(OH)D3+25(OH)D2 SERPL-MCNC: 23 NG/ML (ref 30–100)
BASOPHILS # BLD AUTO: 51 CELLS/UL (ref 0–200)
BASOPHILS NFR BLD AUTO: 0.5 %
EOSINOPHIL # BLD AUTO: 343 CELLS/UL (ref 15–500)
EOSINOPHIL NFR BLD AUTO: 3.4 %
ERYTHROCYTE [DISTWIDTH] IN BLOOD BY AUTOMATED COUNT: 13 % (ref 11–15)
FOLATE SERPL-MCNC: >24 NG/ML
HCT VFR BLD AUTO: 45.5 % (ref 35–45)
HGB BLD-MCNC: 14.7 G/DL (ref 11.7–15.5)
LYMPHOCYTES # BLD AUTO: 2596 CELLS/UL (ref 850–3900)
LYMPHOCYTES NFR BLD AUTO: 25.7 %
MCH RBC QN AUTO: 29.9 PG (ref 27–33)
MCHC RBC AUTO-ENTMCNC: 32.3 G/DL (ref 32–36)
MCV RBC AUTO: 92.7 FL (ref 80–100)
MONOCYTES # BLD AUTO: 687 CELLS/UL (ref 200–950)
MONOCYTES NFR BLD AUTO: 6.8 %
NEUTROPHILS # BLD AUTO: 6424 CELLS/UL (ref 1500–7800)
NEUTROPHILS NFR BLD AUTO: 63.6 %
PLATELET # BLD AUTO: 362 THOUSAND/UL (ref 140–400)
PMV BLD REES-ECKER: 11.3 FL (ref 7.5–12.5)
RBC # BLD AUTO: 4.91 MILLION/UL (ref 3.8–5.1)
VIT B12 SERPL-MCNC: 470 PG/ML (ref 200–1100)
WBC # BLD AUTO: 10.1 THOUSAND/UL (ref 3.8–10.8)

## 2025-03-08 ASSESSMENT — ENCOUNTER SYMPTOMS
HEMATURIA: 0
CHILLS: 0
CONSTIPATION: 0
TREMORS: 0
DIARRHEA: 0
NUMBNESS: 0
DYSURIA: 0
WHEEZING: 0
POLYDIPSIA: 0
FEVER: 0
RHINORRHEA: 0
FREQUENCY: 0
VOMITING: 0
HEADACHES: 0
ABDOMINAL PAIN: 0
POLYPHAGIA: 0
COUGH: 0
SORE THROAT: 0
CONFUSION: 0

## 2025-03-21 ENCOUNTER — HOSPITAL ENCOUNTER (OUTPATIENT)
Dept: RADIOLOGY | Facility: HOSPITAL | Age: 45
Discharge: HOME | End: 2025-03-21
Payer: COMMERCIAL

## 2025-03-21 DIAGNOSIS — Z12.31 ENCOUNTER FOR SCREENING MAMMOGRAM FOR BREAST CANCER: ICD-10-CM

## 2025-03-21 PROCEDURE — 77063 BREAST TOMOSYNTHESIS BI: CPT

## 2025-04-08 ENCOUNTER — APPOINTMENT (OUTPATIENT)
Dept: OBSTETRICS AND GYNECOLOGY | Facility: CLINIC | Age: 45
End: 2025-04-08
Payer: COMMERCIAL

## 2025-04-08 VITALS
BODY MASS INDEX: 24.59 KG/M2 | DIASTOLIC BLOOD PRESSURE: 64 MMHG | HEIGHT: 62 IN | SYSTOLIC BLOOD PRESSURE: 100 MMHG | WEIGHT: 133.6 LBS

## 2025-04-08 DIAGNOSIS — Z01.419 NORMAL GYNECOLOGIC EXAMINATION: ICD-10-CM

## 2025-04-08 DIAGNOSIS — N80.9 ENDOMETRIOSIS: ICD-10-CM

## 2025-04-08 DIAGNOSIS — N94.6 DYSMENORRHEA: ICD-10-CM

## 2025-04-08 DIAGNOSIS — N93.9 ABNORMAL UTERINE BLEEDING (AUB): Primary | ICD-10-CM

## 2025-04-08 DIAGNOSIS — Z12.4 SCREENING FOR CERVICAL CANCER: ICD-10-CM

## 2025-04-08 PROBLEM — K02.9 DENTAL CARIES: Status: RESOLVED | Noted: 2023-08-11 | Resolved: 2025-04-08

## 2025-04-08 PROBLEM — K50.00 CROHN'S DISEASE OF SMALL INTESTINE WITHOUT COMPLICATION (MULTI): Status: RESOLVED | Noted: 2023-02-23 | Resolved: 2025-04-08

## 2025-04-08 PROBLEM — M26.4 MALOCCLUSION: Status: RESOLVED | Noted: 2023-08-11 | Resolved: 2025-04-08

## 2025-04-08 PROBLEM — D72.829 LEUKOCYTOSIS: Status: RESOLVED | Noted: 2023-02-23 | Resolved: 2025-04-08

## 2025-04-08 PROBLEM — Z01.818 PREOPERATIVE CLEARANCE: Status: RESOLVED | Noted: 2023-08-11 | Resolved: 2025-04-08

## 2025-04-08 PROCEDURE — 87624 HPV HI-RISK TYP POOLED RSLT: CPT

## 2025-04-08 PROCEDURE — 4004F PT TOBACCO SCREEN RCVD TLK: CPT | Performed by: OBSTETRICS & GYNECOLOGY

## 2025-04-08 PROCEDURE — 3008F BODY MASS INDEX DOCD: CPT | Performed by: OBSTETRICS & GYNECOLOGY

## 2025-04-08 PROCEDURE — 99386 PREV VISIT NEW AGE 40-64: CPT | Performed by: OBSTETRICS & GYNECOLOGY

## 2025-04-08 RX ORDER — TRANEXAMIC ACID 650 MG/1
1300 TABLET ORAL 3 TIMES DAILY
Qty: 30 TABLET | Refills: 11 | Status: SHIPPED | OUTPATIENT
Start: 2025-04-08 | End: 2025-04-13

## 2025-04-08 NOTE — PROGRESS NOTES
"GYNECOLOGY PROGRESS NOTE        CC:    Chief Complaint   Patient presents with    Annual Exam     New patient - annual exam with pap test - patient states she has endometriosis stage 4  Last pap 2015  Mamm 3/2025 het dense  Colon 2023  Chaperone chad light ma         HPI:  Edilma Benitez is here for a routine GYN examination.     Patient reports that she was diagnosed her with stage IV endometriosis.  Per patient she was told that it was on her bowel and her \"kidneys\" (likely ureters) that were not removed because of their location.  She is also had a bowel resection in 2008 by colorectal surgery.  She has Crohn's disease and sees GI.  As a result of her small bowel resection she has issues with bowel strictures and has had to have several procedures to relieve the strictures via a colonoscopy.  The patient has a history of an endometrial ablation in 2017.  This did not help her chronic heavy cycles.  With her cycles she has pain.  When her cycles are heavier they are more painful.      Depression screen:  negative  Fall screen:  negative  Domestic violence screen:  negative       ROS:  GI - + Crohn's disease symptoms  URO - no hematuria  GYN - see HPI  PSYCH - mood stable--no SI, in treatment        PHYSICAL EXAM:  /64 (BP Location: Left arm, Patient Position: Sitting)   Ht 1.575 m (5' 2\")   Wt 60.6 kg (133 lb 9.6 oz)   LMP 04/02/2025 Comment: denies preg  BMI 24.44 kg/m²   GEN:  A&O, NAD  ABD:  NT/ND, soft, no palpable masses  URO:  normal urethra, no bladder TTP  GYN:  normal vulva and perineum w/o lesions or ulcers, normal vagina without discharge or lesions, normal cervix without lesions or discharge or CMT, uterus palpates as fairly immobile with mild TTP but w/o enlargement, adnexa palpate as fairly immobile with TTP  BREAST:  no masses or TTP, no skin lesions or nipple discharge  DERM:  no hirsutism or acne   PSYCH:  normal affect, non-anxious        PATH RESULTS:  11/3/2008 - bowel " resection  FINAL DIAGNOSIS   TERMINAL ILEUM AND CECUM, RESECTION:    -- ENDOMETRIOSIS INVOLVING BOWEL WALL AND SANTANA-INTESTINAL ADIPOSE TISSUE, SEE   NOTE.   -- ORGANIZING SEROSITIS.   -- APPENDIX WITH NO SIGNIFICANT PATHOLOGIC FINDINGS.   -- FIFTEEN PERICOLONIC LYMPH NODES WITH NO EVIDENCE OF MALIGNANCY.       IMPRESSION/PLAN:  Problem List Items Addressed This Visit       Abnormal uterine bleeding (AUB) - Primary    Relevant Medications    tranexamic acid (Lysteda) 650 mg tablet     Other Visit Diagnoses       Normal gynecologic examination        Screening for cervical cancer        Relevant Orders    THINPREP PAP TEST    Dysmenorrhea        Endometriosis               Pap/HPV done today, no Hx of HGSIL, next pap/HPV due in 3 years (2028) due to lack of recent adequate cervical CA screening.       Mammogram up to date and normal.  Density is heterogeneously dense.  Due to decrease in sensitivity of mammograms in the setting of dense breast tissue a yearly SHIELA mammogram is recommended  PCP managing.    Colon CA screening:  last colonoscopy in 2023, has frequently due to Crohn's Disease.    AUB:  Chronic HMB.  Failed endometrial ablation in 2017.  EMB prior to ablation wnl in 2015.  Mirena IUD most likely not an option due to endometrial cavity scarring from prior endometrial ablation procedure.  Recommend trial of Lysteda--Rx provided, use/AE reviewed, no contraindications to use  If fails medical management will refer patient to my partner Dr. Ray for hysterectomy consultation..    Dysmenorrhea;  Chronic.  Likely due to known stage 4 endometriosis.  Bowel resection PATH from 2008 shows endometriosis involvement of small bowel wall (cecum and terminal ileum, appendix normal).  If fails Lysteda could consider trial of Orlissa.  If fails medical management will refer patient to my partner Dr. Ray as definitive surgery would likely be very complex in this patient with stage 4 endometriosis (including bowel  involvement) and a Hx of a bowel resection (for Crohn's Disease).        Keegan Akins DO

## 2025-04-11 ENCOUNTER — APPOINTMENT (OUTPATIENT)
Dept: OBSTETRICS AND GYNECOLOGY | Facility: CLINIC | Age: 45
End: 2025-04-11
Payer: COMMERCIAL

## 2025-04-22 ENCOUNTER — OFFICE VISIT (OUTPATIENT)
Dept: PRIMARY CARE | Facility: CLINIC | Age: 45
End: 2025-04-22
Payer: COMMERCIAL

## 2025-04-22 VITALS
DIASTOLIC BLOOD PRESSURE: 68 MMHG | RESPIRATION RATE: 16 BRPM | BODY MASS INDEX: 24.48 KG/M2 | SYSTOLIC BLOOD PRESSURE: 110 MMHG | HEIGHT: 62 IN | TEMPERATURE: 97.7 F | WEIGHT: 133 LBS | HEART RATE: 89 BPM | OXYGEN SATURATION: 96 %

## 2025-04-22 DIAGNOSIS — L02.231 CARBUNCLE OF ABDOMINAL WALL: ICD-10-CM

## 2025-04-22 DIAGNOSIS — L98.9 SKIN LESION OF BACK: ICD-10-CM

## 2025-04-22 DIAGNOSIS — J20.9 ACUTE BRONCHITIS, UNSPECIFIED ORGANISM: Primary | ICD-10-CM

## 2025-04-22 PROCEDURE — 4004F PT TOBACCO SCREEN RCVD TLK: CPT | Performed by: FAMILY MEDICINE

## 2025-04-22 PROCEDURE — 3008F BODY MASS INDEX DOCD: CPT | Performed by: FAMILY MEDICINE

## 2025-04-22 PROCEDURE — 99213 OFFICE O/P EST LOW 20 MIN: CPT | Performed by: FAMILY MEDICINE

## 2025-04-22 RX ORDER — BROMPHENIRAMINE MALEATE, PSEUDOEPHEDRINE HYDROCHLORIDE, AND DEXTROMETHORPHAN HYDROBROMIDE 2; 30; 10 MG/5ML; MG/5ML; MG/5ML
10 SYRUP ORAL EVERY 6 HOURS PRN
Qty: 200 ML | Refills: 0 | Status: SHIPPED | OUTPATIENT
Start: 2025-04-22

## 2025-04-22 RX ORDER — MUPIROCIN 20 MG/G
OINTMENT TOPICAL 3 TIMES DAILY
Qty: 22 G | Refills: 0 | Status: SHIPPED | OUTPATIENT
Start: 2025-04-22 | End: 2025-05-02

## 2025-04-22 RX ORDER — AZITHROMYCIN 250 MG/1
TABLET, FILM COATED ORAL
Qty: 6 TABLET | Refills: 0 | Status: SHIPPED | OUTPATIENT
Start: 2025-04-22 | End: 2025-04-27

## 2025-04-22 ASSESSMENT — ENCOUNTER SYMPTOMS
NUMBNESS: 0
SORE THROAT: 0
WHEEZING: 1
COUGH: 1
SHORTNESS OF BREATH: 0
RHINORRHEA: 0
SWOLLEN GLANDS: 0
HEMATURIA: 0
SINUS PAIN: 1
FEVER: 0
DYSURIA: 0
HEADACHES: 1
TREMORS: 0
PALPITATIONS: 0
CONSTIPATION: 0
NECK PAIN: 0
VOMITING: 0
CHILLS: 0
DIARRHEA: 0
NAUSEA: 0
FREQUENCY: 0
ABDOMINAL PAIN: 0
DIZZINESS: 0

## 2025-04-22 ASSESSMENT — PATIENT HEALTH QUESTIONNAIRE - PHQ9
2. FEELING DOWN, DEPRESSED OR HOPELESS: NOT AT ALL
1. LITTLE INTEREST OR PLEASURE IN DOING THINGS: NOT AT ALL
SUM OF ALL RESPONSES TO PHQ9 QUESTIONS 1 AND 2: 0

## 2025-04-22 NOTE — ASSESSMENT & PLAN NOTE
We will have her apply Bactroban ointment to the affected area.  Follow-up if persistent or worsening symptoms otherwise when necessary.

## 2025-04-22 NOTE — PROGRESS NOTES
"Subjective   Patient ID: Edilma Benitez is a 44 y.o. female who presents for URI and Suspicious Skin Lesion.    She complains of a skin lesion on the right side of her back that tends to be traumatized    She also complains of a red sore under her left breast that appeared after her mammogram. She states it initially was bleeding.    URI   This is a new problem. The current episode started in the past 7 days. The problem has been unchanged. There has been no fever. Associated symptoms include congestion, coughing, headaches, sinus pain, sneezing and wheezing. Pertinent negatives include no abdominal pain, chest pain, diarrhea, dysuria, ear pain, nausea, neck pain, rhinorrhea, sore throat, swollen glands or vomiting. She has tried decongestant and antihistamine for the symptoms. The treatment provided no relief.        Review of Systems   Constitutional:  Negative for chills and fever.   HENT:  Positive for congestion, sinus pain and sneezing. Negative for ear pain, nosebleeds, rhinorrhea and sore throat.    Respiratory:  Positive for cough and wheezing. Negative for shortness of breath.    Cardiovascular:  Negative for chest pain, palpitations and leg swelling.   Gastrointestinal:  Negative for abdominal pain, constipation, diarrhea, nausea and vomiting.   Genitourinary:  Negative for dysuria, frequency and hematuria.   Musculoskeletal:  Negative for neck pain.   Neurological:  Positive for headaches. Negative for dizziness, tremors and numbness.       Objective   /68 (BP Location: Left arm, Patient Position: Sitting)   Pulse 89   Temp 36.5 °C (97.7 °F)   Resp 16   Ht 1.575 m (5' 2\")   Wt 60.3 kg (133 lb)   LMP 04/02/2025 Comment: denies preg  SpO2 96%   BMI 24.33 kg/m²     Physical Exam  Constitutional:       General: She is not in acute distress.     Appearance: Normal appearance.   HENT:      Head: Normocephalic and atraumatic.      Mouth/Throat:      Mouth: Mucous membranes are moist.      " Pharynx: Oropharynx is clear. No oropharyngeal exudate or posterior oropharyngeal erythema.   Eyes:      General: No scleral icterus.     Extraocular Movements: Extraocular movements intact.      Pupils: Pupils are equal, round, and reactive to light.   Cardiovascular:      Rate and Rhythm: Normal rate and regular rhythm.      Pulses: Normal pulses.      Heart sounds: No murmur heard.     No friction rub. No gallop.   Pulmonary:      Effort: Pulmonary effort is normal.      Breath sounds: No wheezing, rhonchi or rales.   Skin:     General: Skin is warm.      Coloration: Skin is not jaundiced or pale.      Findings: No erythema or rash.      Comments: The 1 cm rough lesion on which is asymmetrical with rough surface on the right side of the back at the mid thoracic region.  There is erythematous ulcerated area of the skin beneath the left breast with scab.   Neurological:      General: No focal deficit present.      Mental Status: She is alert and oriented to person, place, and time.      Cranial Nerves: No cranial nerve deficit.      Sensory: No sensory deficit.      Coordination: Coordination normal.      Gait: Gait normal.         Assessment/Plan   Problem List Items Addressed This Visit       Acute bronchitis, unspecified - Primary    We will start her on Azithromycin and Bromfed as needed.  Recommend liberal oral fluid intake.  Call if symptoms fail to improve as expected.    Follow-up if persistent or worsening symptoms otherwise when necessary.         Relevant Medications    azithromycin (Zithromax) 250 mg tablet    brompheniramine-pseudoeph-DM 2-30-10 mg/5 mL syrup    Carbuncle of abdominal wall    We will have her apply Bactroban ointment to the affected area.  Follow-up if persistent or worsening symptoms otherwise when necessary.         Relevant Medications    mupirocin (Bactroban) 2 % ointment    Skin lesion of back    We will schedule her for skin lesion excision.         Relevant Orders    Follow Up  In [facility] - PCP - Established     Scribe Attestation  By signing my name below, I, Kilo Concepcion   attest that this documentation has been prepared under the direction and in the presence of Henry Wallace MD.

## 2025-05-09 ENCOUNTER — APPOINTMENT (OUTPATIENT)
Dept: PRIMARY CARE | Facility: CLINIC | Age: 45
End: 2025-05-09
Payer: COMMERCIAL

## 2025-05-09 VITALS
HEIGHT: 62 IN | OXYGEN SATURATION: 98 % | DIASTOLIC BLOOD PRESSURE: 72 MMHG | SYSTOLIC BLOOD PRESSURE: 104 MMHG | WEIGHT: 138 LBS | BODY MASS INDEX: 25.4 KG/M2 | HEART RATE: 113 BPM | TEMPERATURE: 97.8 F | RESPIRATION RATE: 20 BRPM

## 2025-05-09 DIAGNOSIS — L72.0 EPIDERMOID CYST: ICD-10-CM

## 2025-05-09 DIAGNOSIS — L98.9 SKIN LESION OF BACK: ICD-10-CM

## 2025-05-09 PROCEDURE — 11401 EXC TR-EXT B9+MARG 0.6-1 CM: CPT | Performed by: FAMILY MEDICINE

## 2025-05-09 PROCEDURE — 88304 TISSUE EXAM BY PATHOLOGIST: CPT | Performed by: PATHOLOGY

## 2025-05-09 PROCEDURE — 88304 TISSUE EXAM BY PATHOLOGIST: CPT

## 2025-05-09 NOTE — PROGRESS NOTES
Patient ID: Edilma Benitez is a 44 y.o. female.    Excisional Biopsy    Date/Time: 5/9/2025 3:30 PM    Performed by: Henry Wallace MD  Authorized by: Henry Wallace MD    Consent:     Consent obtained:  Written    Consent given by:  Patient    Risks discussed:  Bleeding, infection and pain    Alternatives discussed:  No treatment  Universal protocol:     Procedure explained and questions answered to patient or proxy's satisfaction: yes      Relevant documents present and verified: yes      Site/side marked: yes      Immediately prior to procedure, a time out was called: yes      Patient identity confirmed:  Verbally with patient  Pre-procedure details:     Skin preparation:  Povidone-iodine  Sedation:     Sedation type:  None  Anesthesia:     Anesthesia method:  Local infiltration    Local anesthetic:  Lidocaine 2% WITH epi  Procedure specific details:      Risk,benefit, procedure,alternatives explained and discussed w/ pt.  Pt agreed to proceed. Area was cleansed w/ alcohol prep. 3 ml of 1% Lidocaine w/ epi injected at the base of each lesion to be removed. Adequate anesthesia obtained. Area cleansed again which alcohol and betadine.  An elliptical incision was made around the lesion using a #15 blade.  The incision was carried into the subcutaneous tissue under lesion was removed in toto. It was then placed into specimen jar and sent for surgical pathology. Bleeding stopped w/ pressure and hemostasis was good. The site was closed using 3-0 Ethilon simple interrupted sutures.  Antibiotic ointment and a bandaid were applied. A total of 1 lesion(s) removed. The pt tolerated the procedure well, there were no complications. She was advised to wound care.  Written and verbal aftercare instructions were given.   Post-procedure details:     Procedure completion:  Tolerated well, no immediate complications     no weight-bearing restrictions

## 2025-05-21 ENCOUNTER — APPOINTMENT (OUTPATIENT)
Dept: PRIMARY CARE | Facility: CLINIC | Age: 45
End: 2025-05-21
Payer: COMMERCIAL

## 2025-05-21 DIAGNOSIS — Z48.02 VISIT FOR SUTURE REMOVAL: ICD-10-CM

## 2025-05-21 NOTE — PROGRESS NOTES
Subjective   Patient ID: Edilma Benitez is a 44 y.o. female who presents for Suture / Staple Removal. 4 sutures were removal, patient handled it well.    HPI     Review of Systems    Objective   There were no vitals taken for this visit.    Physical Exam    Assessment/Plan

## 2025-05-22 LAB
LABORATORY COMMENT REPORT: NORMAL
PATH REPORT.ADDENDUM SPEC: NORMAL
PATH REPORT.FINAL DX SPEC: NORMAL
PATH REPORT.GROSS SPEC: NORMAL
PATH REPORT.RELEVANT HX SPEC: NORMAL
PATH REPORT.TOTAL CANCER: NORMAL

## 2025-09-12 ENCOUNTER — APPOINTMENT (OUTPATIENT)
Dept: PRIMARY CARE | Facility: CLINIC | Age: 45
End: 2025-09-12
Payer: COMMERCIAL

## 2026-04-13 ENCOUNTER — APPOINTMENT (OUTPATIENT)
Dept: OBSTETRICS AND GYNECOLOGY | Facility: CLINIC | Age: 46
End: 2026-04-13
Payer: COMMERCIAL